# Patient Record
Sex: MALE | Race: WHITE | Employment: OTHER | URBAN - METROPOLITAN AREA
[De-identification: names, ages, dates, MRNs, and addresses within clinical notes are randomized per-mention and may not be internally consistent; named-entity substitution may affect disease eponyms.]

---

## 2017-02-24 ENCOUNTER — OFFICE VISIT (OUTPATIENT)
Dept: CARDIOLOGY CLINIC | Age: 72
End: 2017-02-24

## 2017-02-24 VITALS
WEIGHT: 162 LBS | SYSTOLIC BLOOD PRESSURE: 180 MMHG | HEIGHT: 68 IN | BODY MASS INDEX: 24.55 KG/M2 | DIASTOLIC BLOOD PRESSURE: 80 MMHG | HEART RATE: 55 BPM

## 2017-02-24 DIAGNOSIS — I25.10 CORONARY ARTERY DISEASE INVOLVING NATIVE CORONARY ARTERY OF NATIVE HEART WITHOUT ANGINA PECTORIS: ICD-10-CM

## 2017-02-24 DIAGNOSIS — E78.5 HYPERLIPIDEMIA, UNSPECIFIED HYPERLIPIDEMIA TYPE: ICD-10-CM

## 2017-02-24 DIAGNOSIS — I10 ESSENTIAL HYPERTENSION, BENIGN: Primary | ICD-10-CM

## 2017-02-24 DIAGNOSIS — I65.23 BILATERAL CAROTID ARTERY STENOSIS: ICD-10-CM

## 2017-02-24 PROCEDURE — G8484 FLU IMMUNIZE NO ADMIN: HCPCS | Performed by: INTERNAL MEDICINE

## 2017-02-24 PROCEDURE — G8598 ASA/ANTIPLAT THER USED: HCPCS | Performed by: INTERNAL MEDICINE

## 2017-02-24 PROCEDURE — 1036F TOBACCO NON-USER: CPT | Performed by: INTERNAL MEDICINE

## 2017-02-24 PROCEDURE — G8428 CUR MEDS NOT DOCUMENT: HCPCS | Performed by: INTERNAL MEDICINE

## 2017-02-24 PROCEDURE — 99214 OFFICE O/P EST MOD 30 MIN: CPT | Performed by: INTERNAL MEDICINE

## 2017-02-24 PROCEDURE — 93000 ELECTROCARDIOGRAM COMPLETE: CPT | Performed by: INTERNAL MEDICINE

## 2017-02-24 PROCEDURE — 4040F PNEUMOC VAC/ADMIN/RCVD: CPT | Performed by: INTERNAL MEDICINE

## 2017-02-24 PROCEDURE — 3017F COLORECTAL CA SCREEN DOC REV: CPT | Performed by: INTERNAL MEDICINE

## 2017-02-24 PROCEDURE — G8420 CALC BMI NORM PARAMETERS: HCPCS | Performed by: INTERNAL MEDICINE

## 2017-02-24 PROCEDURE — 1123F ACP DISCUSS/DSCN MKR DOCD: CPT | Performed by: INTERNAL MEDICINE

## 2017-03-03 ENCOUNTER — HOSPITAL ENCOUNTER (OUTPATIENT)
Dept: OTHER | Age: 72
Discharge: HOME | End: 2017-03-03
Attending: INTERNAL MEDICINE | Admitting: INTERNAL MEDICINE

## 2017-04-11 RX ORDER — BENAZEPRIL HYDROCHLORIDE 40 MG/1
TABLET, FILM COATED ORAL
Qty: 30 TABLET | Refills: 6 | Status: SHIPPED | OUTPATIENT
Start: 2017-04-11 | End: 2017-10-28 | Stop reason: SDUPTHER

## 2017-05-02 RX ORDER — ATENOLOL 25 MG/1
TABLET ORAL
Qty: 30 TABLET | Refills: 6 | Status: SHIPPED | OUTPATIENT
Start: 2017-05-02 | End: 2017-11-24 | Stop reason: SDUPTHER

## 2017-06-06 ENCOUNTER — HOSPITAL ENCOUNTER (OUTPATIENT)
Dept: SURGERY | Age: 72
Discharge: HOME | End: 2017-06-06
Attending: UROLOGY | Admitting: UROLOGY

## 2017-06-06 VITALS
BODY MASS INDEX: 24.59 KG/M2 | WEIGHT: 162.26 LBS | DIASTOLIC BLOOD PRESSURE: 58 MMHG | HEIGHT: 68 IN | SYSTOLIC BLOOD PRESSURE: 161 MMHG | OXYGEN SATURATION: 95 % | TEMPERATURE: 97.2 F | HEART RATE: 54 BPM | RESPIRATION RATE: 14 BRPM

## 2017-06-06 LAB
ANION GAP SERPL CALCULATED.3IONS-SCNC: 13 MMOL/L (ref 3–16)
BUN BLDV-MCNC: 17 MG/DL (ref 7–20)
CALCIUM SERPL-MCNC: 9.3 MG/DL (ref 8.3–10.6)
CHLORIDE BLD-SCNC: 99 MMOL/L (ref 99–110)
CO2: 25 MMOL/L (ref 21–32)
CREAT SERPL-MCNC: 1.1 MG/DL (ref 0.8–1.3)
GFR AFRICAN AMERICAN: >60
GFR NON-AFRICAN AMERICAN: >60
GLUCOSE BLD-MCNC: 113 MG/DL (ref 70–99)
HCT VFR BLD CALC: 42.3 % (ref 40.5–52.5)
HEMOGLOBIN: 14.3 G/DL (ref 13.5–17.5)
MCH RBC QN AUTO: 30.4 PG (ref 26–34)
MCHC RBC AUTO-ENTMCNC: 33.7 G/DL (ref 31–36)
MCV RBC AUTO: 90 FL (ref 80–100)
PDW BLD-RTO: 13.7 % (ref 12.4–15.4)
PLATELET # BLD: 244 K/UL (ref 135–450)
PMV BLD AUTO: 7.6 FL (ref 5–10.5)
POTASSIUM SERPL-SCNC: 4.4 MMOL/L (ref 3.5–5.1)
RBC # BLD: 4.7 M/UL (ref 4.2–5.9)
SODIUM BLD-SCNC: 137 MMOL/L (ref 136–145)
WBC # BLD: 8.1 K/UL (ref 4–11)

## 2017-06-06 RX ORDER — CEFAZOLIN SODIUM 2 G/100ML
2 INJECTION, SOLUTION INTRAVENOUS
Status: COMPLETED | OUTPATIENT
Start: 2017-06-06 | End: 2017-06-06

## 2017-06-06 RX ORDER — SODIUM CHLORIDE, SODIUM LACTATE, POTASSIUM CHLORIDE, CALCIUM CHLORIDE 600; 310; 30; 20 MG/100ML; MG/100ML; MG/100ML; MG/100ML
INJECTION, SOLUTION INTRAVENOUS CONTINUOUS
Status: DISCONTINUED | OUTPATIENT
Start: 2017-06-06 | End: 2017-06-07 | Stop reason: HOSPADM

## 2017-06-06 RX ORDER — ONDANSETRON 2 MG/ML
4 INJECTION INTRAMUSCULAR; INTRAVENOUS
Status: ACTIVE | OUTPATIENT
Start: 2017-06-06 | End: 2017-06-06

## 2017-06-06 RX ORDER — SODIUM CHLORIDE 9 MG/ML
INJECTION, SOLUTION INTRAVENOUS CONTINUOUS
Status: DISCONTINUED | OUTPATIENT
Start: 2017-06-06 | End: 2017-06-07 | Stop reason: HOSPADM

## 2017-06-06 RX ORDER — LIDOCAINE HYDROCHLORIDE 10 MG/ML
1 INJECTION, SOLUTION EPIDURAL; INFILTRATION; INTRACAUDAL; PERINEURAL
Status: ACTIVE | OUTPATIENT
Start: 2017-06-06 | End: 2017-06-06

## 2017-06-06 RX ORDER — LIDOCAINE HYDROCHLORIDE 10 MG/ML
0.5 INJECTION, SOLUTION EPIDURAL; INFILTRATION; INTRACAUDAL; PERINEURAL ONCE
Status: DISCONTINUED | OUTPATIENT
Start: 2017-06-06 | End: 2017-06-07 | Stop reason: HOSPADM

## 2017-06-06 RX ORDER — HYDROCODONE BITARTRATE AND ACETAMINOPHEN 5; 325 MG/1; MG/1
1 TABLET ORAL
Status: ACTIVE | OUTPATIENT
Start: 2017-06-06 | End: 2017-06-06

## 2017-06-06 RX ORDER — CEFAZOLIN SODIUM 2 G/100ML
INJECTION, SOLUTION INTRAVENOUS
Status: DISPENSED
Start: 2017-06-06 | End: 2017-06-06

## 2017-06-06 RX ORDER — SODIUM CHLORIDE, SODIUM LACTATE, POTASSIUM CHLORIDE, CALCIUM CHLORIDE 600; 310; 30; 20 MG/100ML; MG/100ML; MG/100ML; MG/100ML
INJECTION, SOLUTION INTRAVENOUS
Status: DISPENSED
Start: 2017-06-06 | End: 2017-06-06

## 2017-06-06 RX ORDER — HYDROMORPHONE HCL 110MG/55ML
0.25 PATIENT CONTROLLED ANALGESIA SYRINGE INTRAVENOUS EVERY 5 MIN PRN
Status: DISCONTINUED | OUTPATIENT
Start: 2017-06-06 | End: 2017-06-07 | Stop reason: HOSPADM

## 2017-06-06 RX ADMIN — CEFAZOLIN SODIUM 2 G: 2 INJECTION, SOLUTION INTRAVENOUS at 09:49

## 2017-06-06 ASSESSMENT — COPD QUESTIONNAIRES: CAT_SEVERITY: MILD

## 2017-06-06 ASSESSMENT — ENCOUNTER SYMPTOMS: SHORTNESS OF BREATH: 0

## 2017-07-07 ENCOUNTER — TELEPHONE (OUTPATIENT)
Dept: ORTHOPEDIC SURGERY | Age: 72
End: 2017-07-07

## 2017-08-25 ENCOUNTER — OFFICE VISIT (OUTPATIENT)
Dept: CARDIOLOGY CLINIC | Age: 72
End: 2017-08-25

## 2017-08-25 VITALS
HEART RATE: 56 BPM | SYSTOLIC BLOOD PRESSURE: 180 MMHG | WEIGHT: 165 LBS | DIASTOLIC BLOOD PRESSURE: 66 MMHG | BODY MASS INDEX: 25.9 KG/M2 | HEIGHT: 67 IN

## 2017-08-25 DIAGNOSIS — I25.10 CORONARY ARTERY DISEASE INVOLVING NATIVE CORONARY ARTERY OF NATIVE HEART WITHOUT ANGINA PECTORIS: ICD-10-CM

## 2017-08-25 DIAGNOSIS — I65.23 BILATERAL CAROTID ARTERY STENOSIS: ICD-10-CM

## 2017-08-25 DIAGNOSIS — I10 ESSENTIAL HYPERTENSION, BENIGN: Primary | ICD-10-CM

## 2017-08-25 DIAGNOSIS — E78.5 HYPERLIPIDEMIA, UNSPECIFIED HYPERLIPIDEMIA TYPE: ICD-10-CM

## 2017-08-25 PROCEDURE — G8427 DOCREV CUR MEDS BY ELIG CLIN: HCPCS | Performed by: INTERNAL MEDICINE

## 2017-08-25 PROCEDURE — 4040F PNEUMOC VAC/ADMIN/RCVD: CPT | Performed by: INTERNAL MEDICINE

## 2017-08-25 PROCEDURE — 3017F COLORECTAL CA SCREEN DOC REV: CPT | Performed by: INTERNAL MEDICINE

## 2017-08-25 PROCEDURE — 99214 OFFICE O/P EST MOD 30 MIN: CPT | Performed by: INTERNAL MEDICINE

## 2017-08-25 PROCEDURE — G8419 CALC BMI OUT NRM PARAM NOF/U: HCPCS | Performed by: INTERNAL MEDICINE

## 2017-08-25 PROCEDURE — G8598 ASA/ANTIPLAT THER USED: HCPCS | Performed by: INTERNAL MEDICINE

## 2017-08-25 PROCEDURE — 1123F ACP DISCUSS/DSCN MKR DOCD: CPT | Performed by: INTERNAL MEDICINE

## 2017-08-25 PROCEDURE — 1036F TOBACCO NON-USER: CPT | Performed by: INTERNAL MEDICINE

## 2017-08-25 RX ORDER — FUROSEMIDE 20 MG/1
20 TABLET ORAL DAILY
Qty: 30 TABLET | Refills: 6 | Status: SHIPPED | OUTPATIENT
Start: 2017-08-25 | End: 2018-02-09 | Stop reason: SDUPTHER

## 2017-08-29 RX ORDER — ATORVASTATIN CALCIUM 80 MG/1
TABLET, FILM COATED ORAL
Qty: 30 TABLET | Refills: 6 | Status: SHIPPED | OUTPATIENT
Start: 2017-08-29 | End: 2018-02-09 | Stop reason: SDUPTHER

## 2017-08-31 ENCOUNTER — OFFICE VISIT (OUTPATIENT)
Dept: ORTHOPEDIC SURGERY | Age: 72
End: 2017-08-31

## 2017-08-31 VITALS
DIASTOLIC BLOOD PRESSURE: 66 MMHG | WEIGHT: 164 LBS | HEART RATE: 62 BPM | SYSTOLIC BLOOD PRESSURE: 157 MMHG | HEIGHT: 68 IN | BODY MASS INDEX: 24.86 KG/M2

## 2017-08-31 DIAGNOSIS — Z96.612 STATUS POST REVERSE TOTAL ARTHROPLASTY OF LEFT SHOULDER: Primary | ICD-10-CM

## 2017-08-31 PROCEDURE — 1036F TOBACCO NON-USER: CPT | Performed by: ORTHOPAEDIC SURGERY

## 2017-08-31 PROCEDURE — 99213 OFFICE O/P EST LOW 20 MIN: CPT | Performed by: ORTHOPAEDIC SURGERY

## 2017-08-31 PROCEDURE — 3017F COLORECTAL CA SCREEN DOC REV: CPT | Performed by: ORTHOPAEDIC SURGERY

## 2017-08-31 PROCEDURE — G8420 CALC BMI NORM PARAMETERS: HCPCS | Performed by: ORTHOPAEDIC SURGERY

## 2017-08-31 PROCEDURE — 4040F PNEUMOC VAC/ADMIN/RCVD: CPT | Performed by: ORTHOPAEDIC SURGERY

## 2017-08-31 PROCEDURE — G8427 DOCREV CUR MEDS BY ELIG CLIN: HCPCS | Performed by: ORTHOPAEDIC SURGERY

## 2017-08-31 PROCEDURE — G8598 ASA/ANTIPLAT THER USED: HCPCS | Performed by: ORTHOPAEDIC SURGERY

## 2017-08-31 PROCEDURE — 1123F ACP DISCUSS/DSCN MKR DOCD: CPT | Performed by: ORTHOPAEDIC SURGERY

## 2017-10-30 RX ORDER — BENAZEPRIL HYDROCHLORIDE 40 MG/1
TABLET, FILM COATED ORAL
Qty: 90 TABLET | Refills: 3 | Status: SHIPPED | OUTPATIENT
Start: 2017-10-30 | End: 2018-02-09 | Stop reason: SDUPTHER

## 2017-11-24 RX ORDER — ATENOLOL 25 MG/1
TABLET ORAL
Qty: 30 TABLET | Refills: 6 | Status: SHIPPED | OUTPATIENT
Start: 2017-11-24 | End: 2018-02-09 | Stop reason: SDUPTHER

## 2018-02-09 ENCOUNTER — OFFICE VISIT (OUTPATIENT)
Dept: CARDIOLOGY CLINIC | Age: 73
End: 2018-02-09

## 2018-02-09 VITALS
HEIGHT: 68 IN | WEIGHT: 161 LBS | BODY MASS INDEX: 24.4 KG/M2 | HEART RATE: 60 BPM | DIASTOLIC BLOOD PRESSURE: 68 MMHG | SYSTOLIC BLOOD PRESSURE: 150 MMHG

## 2018-02-09 DIAGNOSIS — I65.23 BILATERAL CAROTID ARTERY STENOSIS: ICD-10-CM

## 2018-02-09 DIAGNOSIS — I25.10 CORONARY ARTERY DISEASE INVOLVING NATIVE CORONARY ARTERY OF NATIVE HEART WITHOUT ANGINA PECTORIS: Primary | ICD-10-CM

## 2018-02-09 DIAGNOSIS — E78.49 OTHER HYPERLIPIDEMIA: ICD-10-CM

## 2018-02-09 DIAGNOSIS — R00.1 BRADYCARDIA: ICD-10-CM

## 2018-02-09 DIAGNOSIS — I10 ESSENTIAL HYPERTENSION, BENIGN: ICD-10-CM

## 2018-02-09 PROCEDURE — G8484 FLU IMMUNIZE NO ADMIN: HCPCS | Performed by: INTERNAL MEDICINE

## 2018-02-09 PROCEDURE — 99214 OFFICE O/P EST MOD 30 MIN: CPT | Performed by: INTERNAL MEDICINE

## 2018-02-09 PROCEDURE — 4040F PNEUMOC VAC/ADMIN/RCVD: CPT | Performed by: INTERNAL MEDICINE

## 2018-02-09 PROCEDURE — G8420 CALC BMI NORM PARAMETERS: HCPCS | Performed by: INTERNAL MEDICINE

## 2018-02-09 PROCEDURE — 1123F ACP DISCUSS/DSCN MKR DOCD: CPT | Performed by: INTERNAL MEDICINE

## 2018-02-09 PROCEDURE — 1036F TOBACCO NON-USER: CPT | Performed by: INTERNAL MEDICINE

## 2018-02-09 PROCEDURE — G8598 ASA/ANTIPLAT THER USED: HCPCS | Performed by: INTERNAL MEDICINE

## 2018-02-09 PROCEDURE — G8427 DOCREV CUR MEDS BY ELIG CLIN: HCPCS | Performed by: INTERNAL MEDICINE

## 2018-02-09 PROCEDURE — 3017F COLORECTAL CA SCREEN DOC REV: CPT | Performed by: INTERNAL MEDICINE

## 2018-02-09 RX ORDER — ATORVASTATIN CALCIUM 80 MG/1
TABLET, FILM COATED ORAL
Qty: 90 TABLET | Refills: 3 | Status: SHIPPED | OUTPATIENT
Start: 2018-02-09 | End: 2019-03-20 | Stop reason: SDUPTHER

## 2018-02-09 RX ORDER — BENAZEPRIL HYDROCHLORIDE 40 MG/1
TABLET, FILM COATED ORAL
Qty: 90 TABLET | Refills: 3 | Status: SHIPPED | OUTPATIENT
Start: 2018-02-09 | End: 2019-05-09 | Stop reason: SDUPTHER

## 2018-02-09 RX ORDER — DOXAZOSIN MESYLATE 4 MG/1
4 TABLET ORAL NIGHTLY
Qty: 90 TABLET | Refills: 3 | Status: SHIPPED | OUTPATIENT
Start: 2018-02-09 | End: 2019-02-06 | Stop reason: SDUPTHER

## 2018-02-09 RX ORDER — FUROSEMIDE 20 MG/1
40 TABLET ORAL DAILY
Qty: 180 TABLET | Refills: 3 | Status: SHIPPED | OUTPATIENT
Start: 2018-02-09 | End: 2018-08-31 | Stop reason: CLARIF

## 2018-02-09 RX ORDER — ATENOLOL 25 MG/1
TABLET ORAL
Qty: 90 TABLET | Refills: 3 | Status: SHIPPED | OUTPATIENT
Start: 2018-02-09 | End: 2019-03-06 | Stop reason: SDUPTHER

## 2018-02-09 RX ORDER — DULOXETIN HYDROCHLORIDE 60 MG/1
60 CAPSULE, DELAYED RELEASE ORAL DAILY
COMMUNITY
End: 2022-05-31

## 2018-02-09 NOTE — PROGRESS NOTES
no sputum production. No hematemesis. · Gastrointestinal: No abdominal pain, appetite loss, blood in stools. No change in bowel or bladder habits. · Genitourinary: No nocturia, dysuria, trouble voiding. · Musculoskeletal:  No gait disturbance, weakness or joint complaints. Large reddened abrasion noted LLE shin. · Integumentary: No rash or pruritis. · Neurological: No headache, change in muscle strength, numbness or tingling. No change in gait, balance, coordination, mood, affect, memory, mentation, behavior. · Psychiatric: No anxiety or depression  · Endocrine: No malaise or fever  · Hematologic/Lymphatic: No abnormal bruising or bleeding, blood clots or swollen lymph nodes. · Allergic/Immunologic: No nasal congestion or hives. Physical Examination:    Vitals:    02/09/18 1411 02/09/18 1418   BP: (!) 158/68 (!) 150/68   Site: Right Arm    Position: Sitting    Cuff Size: Medium Adult    Pulse: 60    Weight: 161 lb (73 kg)    Height: 5' 8\" (1.727 m)      Body mass index is 24.48 kg/m². Wt Readings from Last 3 Encounters:   02/09/18 161 lb (73 kg)   08/31/17 164 lb (74.4 kg)   08/25/17 165 lb (74.8 kg)     BP Readings from Last 3 Encounters:   02/09/18 (!) 150/68   08/31/17 (!) 157/66   08/25/17 (!) 180/66       Constitutional and General Appearance:  appears stated age  Respiratory:  · Normal excursion and expansion without use of accessory muscles  · Resp Auscultation: Normal breath sounds without dullness  Cardiovascular:  · The apical impulses not displaced  · Heart is regular rate and rhythm with normal S1, S2  · The carotid upstroke is normal,  right bruit auscultated.    · JVP is not elevated  · Peripheral pulses are symmetrical  · There is no clubbing, cyanosis of the extremities  · 2+ edema BLE  · Femoral Arteries: 2+ and equal  · Pedal Pulses: 2+ and equal   Abdomen:  · No masses or tenderness  · Normal bowel sounds  Neurological/Psychiatric:  · Alert and oriented x3  · Moves all extremities well. Quarter sized reddened abrasion noted lower right shin, etiology unclear, 1days old. · Exhibits normal gait balance and coordination    Assessment/Plan:  1. CAD (coronary artery disease): Stable, no anginal symptoms. GXT tamia 5> normal myocardial perfusion  GXT Tamia 3/5/08> small sized basilar fixed defect consistent with ischemia  Angiogram 8/14/07> Taxus to distal RCA and mid LAD. Not on Plavix due to hematuria from the bladder cancer. Takes 81mg aspirin   2. Other and unspecified hyperlipidemia:  11/22/17> , , HDL 39, LDL 99 -- stable Lipitor 80mg. 3. 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 Doppler 3/17> 50-79% bilateral  Dopplers 6/53> MARILIN 94-85%, LICA 16-95%   4. Essential hypertension, benign: SBP elevated. Blood pressure (!) 150/68, pulse 60, height 5' 8\" (1.727 m), weight 161 lb (73 kg). Increase Lasix from 20mg to 40mg daily. Add Cardura 4mg nightly. 5.  Sinus bradycardia, h/o: 60 today. 6.  Bladder cancer, Stage I, h/o: Stable, no more hematuria, has flow issues. Has not required chemo as of yet. 7.  Myelomalacia and a spinal cord lesion: Following closely with Dr Daria Buerger    1. Double Lasix to 40mg qd. Begin Cardura 4mg hs.  2. Check B/P every day, record, bring into the office. 3. Return for follow up in 6 months.

## 2018-03-07 RX ORDER — FUROSEMIDE 20 MG/1
20 TABLET ORAL DAILY
Qty: 30 TABLET | Refills: 6 | Status: SHIPPED | OUTPATIENT
Start: 2018-03-07 | End: 2018-08-31 | Stop reason: SDUPTHER

## 2018-03-09 ENCOUNTER — HOSPITAL ENCOUNTER (OUTPATIENT)
Dept: CARDIOLOGY | Age: 73
Discharge: HOME | End: 2018-03-09
Attending: INTERNAL MEDICINE | Admitting: INTERNAL MEDICINE

## 2018-03-09 DIAGNOSIS — I65.23 OCCLUSION AND STENOSIS OF BILATERAL CAROTID ARTERIES: ICD-10-CM

## 2018-03-21 ENCOUNTER — TELEPHONE (OUTPATIENT)
Dept: CARDIOLOGY CLINIC | Age: 73
End: 2018-03-21

## 2018-08-31 ENCOUNTER — OFFICE VISIT (OUTPATIENT)
Dept: CARDIOLOGY CLINIC | Age: 73
End: 2018-08-31

## 2018-08-31 VITALS
SYSTOLIC BLOOD PRESSURE: 160 MMHG | HEART RATE: 64 BPM | BODY MASS INDEX: 23.49 KG/M2 | WEIGHT: 155 LBS | HEIGHT: 68 IN | DIASTOLIC BLOOD PRESSURE: 60 MMHG

## 2018-08-31 DIAGNOSIS — I65.23 BILATERAL CAROTID ARTERY STENOSIS: ICD-10-CM

## 2018-08-31 DIAGNOSIS — I10 ESSENTIAL HYPERTENSION, BENIGN: ICD-10-CM

## 2018-08-31 DIAGNOSIS — C67.9 MALIGNANT NEOPLASM OF URINARY BLADDER, UNSPECIFIED SITE (HCC): ICD-10-CM

## 2018-08-31 DIAGNOSIS — I25.10 CORONARY ARTERY DISEASE INVOLVING NATIVE CORONARY ARTERY OF NATIVE HEART WITHOUT ANGINA PECTORIS: Primary | ICD-10-CM

## 2018-08-31 DIAGNOSIS — E78.49 OTHER HYPERLIPIDEMIA: ICD-10-CM

## 2018-08-31 PROCEDURE — G8598 ASA/ANTIPLAT THER USED: HCPCS | Performed by: INTERNAL MEDICINE

## 2018-08-31 PROCEDURE — 3017F COLORECTAL CA SCREEN DOC REV: CPT | Performed by: INTERNAL MEDICINE

## 2018-08-31 PROCEDURE — G8427 DOCREV CUR MEDS BY ELIG CLIN: HCPCS | Performed by: INTERNAL MEDICINE

## 2018-08-31 PROCEDURE — 4040F PNEUMOC VAC/ADMIN/RCVD: CPT | Performed by: INTERNAL MEDICINE

## 2018-08-31 PROCEDURE — G8420 CALC BMI NORM PARAMETERS: HCPCS | Performed by: INTERNAL MEDICINE

## 2018-08-31 PROCEDURE — 1036F TOBACCO NON-USER: CPT | Performed by: INTERNAL MEDICINE

## 2018-08-31 PROCEDURE — 1123F ACP DISCUSS/DSCN MKR DOCD: CPT | Performed by: INTERNAL MEDICINE

## 2018-08-31 PROCEDURE — 99214 OFFICE O/P EST MOD 30 MIN: CPT | Performed by: INTERNAL MEDICINE

## 2018-08-31 PROCEDURE — 1101F PT FALLS ASSESS-DOCD LE1/YR: CPT | Performed by: INTERNAL MEDICINE

## 2018-08-31 RX ORDER — FUROSEMIDE 40 MG/1
40 TABLET ORAL DAILY
Qty: 90 TABLET | Refills: 3 | Status: SHIPPED | OUTPATIENT
Start: 2018-08-31 | End: 2019-09-20

## 2018-08-31 NOTE — LETTER
[address] [address] [address] Charlie Mccormack [address] [address]  Phone: [phone]  Fax: [phone]    Robert Diaz MD        September 4, 2018     [address] [place] [address]  [id number] [facility] [address] [address]    Patient: Rudolph Lord  MR Number: [id number]  YOB: 1945  Date of Visit: 8/31/2018    Dear  [address] [place] [address]:      [address]   Cardiac Evaluation      Patient: Rudolph Lord  YOB: 1945  Date: 8/31/18     Chief Complaint   Patient presents with    Coronary Artery Disease    Hypertension      Referring provider: [address] [place] [address]    History of Present Illness:   Mr. Ishaan Abarca is seen today for follow up. Cardiac history includes coronary disease, hypertension, and hyperlipidemia. He had angioplasty and stent placement of RCA and LAD in 2007 after a positive stress test and chest pain. Other history includes bladder cancer which is apparently stable. Quit smoking in 2008. At our last visit I increased his Lasix to 40 mg daily and added Cardura 4 mg nightly. Today, Mr. Ihsaan Abarca states he has been alright. Patient denies shortness of breath, palpitations, or dizziness. States his chest feels \"tight\" in his chest but it \"isn't painful\" and has been like that for years. Denies hematuria, but his bladder isn't emptying fully. Says he hasn't been very active. Past Medical History:  has a past medical history of CAD (coronary artery disease); Arthritis; Hypertension; GERD,Hyperlipidemia. Bladder cancer    Surgical History:   has a past surgical history that includes Coronary angioplasty with stent; hernia repair; Cystoscopy (6-7-10); Colonoscopy; Cystocopy (1/9/12); Cystoscopy (7/23/12); Cystocopy (2/25/13); Cystoscopy (8/26/13); Cystoscopy (4/29/14); Cystocopy (12/15/14); Cystocopy (8/3/2015); other surgical history (Left, 8/28/15); joint replacement; Cystocopy (5/24/16); and Cystocopy (06/04/2017). · Resp Auscultation: Normal breath sounds without dullness  Cardiovascular:  · The apical impulses not displaced  · Heart is regular rate and rhythm with normal S1, S2  · The carotid upstroke is normal,  right bruit auscultated. · JVP is not elevated  · Peripheral pulses are symmetrical  · Trace edema in bilateral ankles  · There is no clubbing, cyanosis of the extremities  · Femoral Arteries: 2+ and equal  · Pedal Pulses: 2+ and equal   Abdomen:  · No masses or tenderness  · Normal bowel sounds  Neurological/Psychiatric:  · Alert and oriented x3  · Moves all extremities well. · Exhibits normal gait balance and coordination    Assessment:  1. CAD (coronary artery disease): Stable, no anginal symptoms. GXT leo 6/25/2015> normal myocardial perfusion  GXT Leo 3/5/08> small sized basilar fixed defect consistent with ischemia  Angiogram 8/14/07> Taxus to distal RCA and mid LAD. Not on Plavix due to hematuria from the bladder cancer. Takes 81mg aspirin   2. Other and unspecified hyperlipidemia:  6/13/18> , TG 93, HDL 42, LDL 93 -- stable Lipitor 80mg. 3. 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 Doppler 3/18> MARILIN <02% stenosis, LICA 06-11% stenosis  Doppler 3/17> 50-79% bilateral  Dopplers 9/02> MARILIN 28-84%, LICA 95-49%   4. Essential hypertension, benign: Blood pressure (!) 160/60, pulse 64, height 5' 8\" (1.727 m), weight 155 lb (70.3 kg). States yesterday was in the 140's, did not take Lasix this am.  Reminded of med compliance. 5.  Sinus bradycardia, h/o: 64 today. Stable  6. Bladder cancer, Stage I, h/o: Stable, no more hematuria, has flow issues. Has not required chemo as of yet. 7.  Myelomalacia and a spinal cord lesion: Following closely with Dr Juan Grewal. His PCP has ordered LE dopplers for his legs. His pulses are normal.     Plan:  1. Change Lasix to 40 mg daily  2. Continue other meds  3. Follow up in 6 months    Scribe's Attestation:  This note was scribed in the presence of Dr. Jany Blake,

## 2018-08-31 NOTE — PROGRESS NOTES
[facility]   Cardiac Evaluation      Patient: Adelfo Burch  YOB: 1945  Date: 8/31/18     Chief Complaint   Patient presents with    Coronary Artery Disease    Hypertension      Referring provider: Marcela Cordero    History of Present Illness:   Mr. Kelsea Koenig is seen today for follow up. Cardiac history includes coronary disease, hypertension, and hyperlipidemia. He had angioplasty and stent placement of RCA and LAD in 2007 after a positive stress test and chest pain. Other history includes bladder cancer which is apparently stable. Quit smoking in 2008. At our last visit I increased his Lasix to 40 mg daily and added Cardura 4 mg nightly. Today, Mr. Kelsea Koenig states he has been alright. Patient denies shortness of breath, palpitations, or dizziness. States his chest feels \"tight\" in his chest but it \"isn't painful\" and has been like that for years. Denies hematuria, but his bladder isn't emptying fully. Says he hasn't been very active. Past Medical History:  has a past medical history of CAD (coronary artery disease); Arthritis; Hypertension; GERD,Hyperlipidemia. Bladder cancer    Surgical History:   has a past surgical history that includes Coronary angioplasty with stent; hernia repair; Cystoscopy (6-7-10); Colonoscopy; Cystocopy (1/9/12); Cystoscopy (7/23/12); Cystocopy (2/25/13); Cystoscopy (8/26/13); Cystoscopy (4/29/14); Cystocopy (12/15/14); Cystocopy (8/3/2015); other surgical history (Left, 8/28/15); joint replacement; Cystocopy (5/24/16); and Cystocopy (06/04/2017). Social History:   reports that he quit smoking in 2008. He reports that he does not currently drink alcohol or use illicit drugs. Family History:  family history includes Cancer in his father; Stroke in his mother. Allergies:  Patient has no known allergies. Review of Systems:   · Constitutional: there has been no unanticipated weight loss.   · Eyes: No visual changes   · ENT: No Headaches, masses or tenderness  · Normal bowel sounds  Neurological/Psychiatric:  · Alert and oriented x3  · Moves all extremities well. · Exhibits normal gait balance and coordination    Assessment:  1. CAD (coronary artery disease): Stable, no anginal symptoms. GXT leo 6/25/2015> normal myocardial perfusion  GXT Leo 3/5/08> small sized basilar fixed defect consistent with ischemia  Angiogram 8/14/07> Taxus to distal RCA and mid LAD. Not on Plavix due to hematuria from the bladder cancer. Takes 81mg aspirin   2. Other and unspecified hyperlipidemia:  6/13/18> , TG 93, HDL 42, LDL 93 -- stable Lipitor 80mg. 3. 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 Doppler 3/18> MARILIN <75% stenosis, LICA 43-11% stenosis  Doppler 3/17> 50-79% bilateral  Dopplers 7/75> MARILIN 79-66%, LICA 13-74%   4. Essential hypertension, benign: Blood pressure (!) 160/60, pulse 64, height 5' 8\" (1.727 m), weight 155 lb (70.3 kg). States yesterday was in the 140's, did not take Lasix this am.  Reminded of med compliance. 5.  Sinus bradycardia, h/o: 64 today. Stable  6. Bladder cancer, Stage I, h/o: Stable, no more hematuria, has flow issues. Has not required chemo as of yet. 7.  Myelomalacia and a spinal cord lesion: Following closely with Dr Mari Hernandez. His PCP has ordered LE dopplers for his legs. His pulses are normal.     Plan:  1. Change Lasix to 40 mg daily  2. Continue other meds  3. Follow up in 6 months    Scribe's Attestation: This note was scribed in the presence of Dr. Lurdes Allison MD by Darlene Rooney RN. The scribe's documentation has been prepared under my direction and personally reviewed by me in its entirety. I confirm that the note above accurately reflects all work, treatment, procedures, and medical decision making performed by me.        Willem Merrill MD

## 2018-09-04 NOTE — COMMUNICATION BODY
hearing loss or vertigo. No mouth sores or sore throat. · Cardiovascular: Reviewed in HPI  · Respiratory: No cough or wheezing, no sputum production. No hematemesis. · Gastrointestinal: No abdominal pain, appetite loss, blood in stools. No change in bowel or bladder habits. · Genitourinary: No nocturia, dysuria, trouble voiding. · Musculoskeletal:  No gait disturbance, weakness or joint complaints. Large reddened abrasion noted LLE shin. · Integumentary: No rash or pruritis. · Neurological: No headache, change in muscle strength, numbness or tingling. No change in gait, balance, coordination, mood, affect, memory, mentation, behavior. · Psychiatric: No anxiety or depression  · Endocrine: No malaise or fever  · Hematologic/Lymphatic: No abnormal bruising or bleeding, blood clots or swollen lymph nodes. · Allergic/Immunologic: No nasal congestion or hives. Physical Examination:    Vitals:    08/31/18 1349   BP: (!) 160/60   Site: Left Arm   Position: Sitting   Cuff Size: Medium Adult   Pulse: 64   Weight: 155 lb (70.3 kg)   Height: 5' 8\" (1.727 m)     Body mass index is 23.57 kg/m². Wt Readings from Last 3 Encounters:   08/31/18 155 lb (70.3 kg)   02/09/18 161 lb (73 kg)   08/31/17 164 lb (74.4 kg)     BP Readings from Last 3 Encounters:   08/31/18 (!) 160/60   02/09/18 (!) 150/68   08/31/17 (!) 157/66       Constitutional and General Appearance:  appears stated age  Respiratory:  · Normal excursion and expansion without use of accessory muscles  · Resp Auscultation: Normal breath sounds without dullness  Cardiovascular:  · The apical impulses not displaced  · Heart is regular rate and rhythm with normal S1, S2  · The carotid upstroke is normal,  right bruit auscultated.    · JVP is not elevated  · Peripheral pulses are symmetrical  · Trace edema in bilateral ankles  · There is no clubbing, cyanosis of the extremities  · Femoral Arteries: 2+ and equal  · Pedal Pulses: 2+ and equal   Abdomen:  · No masses or tenderness  · Normal bowel sounds  Neurological/Psychiatric:  · Alert and oriented x3  · Moves all extremities well. · Exhibits normal gait balance and coordination    Assessment:  1. CAD (coronary artery disease): Stable, no anginal symptoms. GXT leo 6/25/2015> normal myocardial perfusion  GXT Leo 3/5/08> small sized basilar fixed defect consistent with ischemia  Angiogram 8/14/07> Taxus to distal RCA and mid LAD. Not on Plavix due to hematuria from the bladder cancer. Takes 81mg aspirin   2. Other and unspecified hyperlipidemia:  6/13/18> , TG 93, HDL 42, LDL 93 -- stable Lipitor 80mg. 3. 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 Doppler 3/18> MARILIN <41% stenosis, LICA 05-28% stenosis  Doppler 3/17> 50-79% bilateral  Dopplers 2/99> MARILIN 72-72%, LICA 21-94%   4. Essential hypertension, benign: Blood pressure (!) 160/60, pulse 64, height 5' 8\" (1.727 m), weight 155 lb (70.3 kg). States yesterday was in the 140's, did not take Lasix this am.  Reminded of med compliance. 5.  Sinus bradycardia, h/o: 64 today. Stable  6. Bladder cancer, Stage I, h/o: Stable, no more hematuria, has flow issues. Has not required chemo as of yet. 7.  Myelomalacia and a spinal cord lesion: Following closely with Dr Mari Hernandez. His PCP has ordered LE dopplers for his legs. His pulses are normal.     Plan:  1. Change Lasix to 40 mg daily  2. Continue other meds  3. Follow up in 6 months    Scribe's Attestation: This note was scribed in the presence of Dr. Lurdes Allison MD by Darlene Rooney RN. The scribe's documentation has been prepared under my direction and personally reviewed by me in its entirety. I confirm that the note above accurately reflects all work, treatment, procedures, and medical decision making performed by me.        Willem Merrill MD

## 2018-10-16 ENCOUNTER — HOSPITAL ENCOUNTER (OUTPATIENT)
Dept: NON INVASIVE DIAGNOSTICS | Age: 73
Discharge: HOME | End: 2018-10-16
Payer: MEDICARE

## 2018-10-16 LAB
LEFT VENTRICULAR EJECTION FRACTION HIGH VALUE: 60 %
LEFT VENTRICULAR EJECTION FRACTION MODE: NORMAL
LV EF: 55 %
LV EF: 58 %
LVEF MODALITY: NORMAL

## 2018-10-16 PROCEDURE — 93306 TTE W/DOPPLER COMPLETE: CPT

## 2019-02-06 RX ORDER — DOXAZOSIN MESYLATE 4 MG/1
4 TABLET ORAL NIGHTLY
Qty: 90 TABLET | Refills: 3 | Status: SHIPPED | OUTPATIENT
Start: 2019-02-06 | End: 2020-01-24

## 2019-02-22 ENCOUNTER — OFFICE VISIT (OUTPATIENT)
Dept: CARDIOLOGY CLINIC | Age: 74
End: 2019-02-22
Payer: MEDICARE

## 2019-02-22 VITALS
HEIGHT: 68 IN | SYSTOLIC BLOOD PRESSURE: 132 MMHG | HEART RATE: 59 BPM | BODY MASS INDEX: 24.55 KG/M2 | WEIGHT: 162 LBS | DIASTOLIC BLOOD PRESSURE: 62 MMHG

## 2019-02-22 DIAGNOSIS — E78.49 OTHER HYPERLIPIDEMIA: ICD-10-CM

## 2019-02-22 DIAGNOSIS — I10 ESSENTIAL HYPERTENSION, BENIGN: Primary | ICD-10-CM

## 2019-02-22 DIAGNOSIS — I25.10 CORONARY ARTERY DISEASE INVOLVING NATIVE CORONARY ARTERY OF NATIVE HEART WITHOUT ANGINA PECTORIS: ICD-10-CM

## 2019-02-22 DIAGNOSIS — R20.2 NUMBNESS AND TINGLING: ICD-10-CM

## 2019-02-22 DIAGNOSIS — R20.0 NUMBNESS AND TINGLING: ICD-10-CM

## 2019-02-22 PROCEDURE — G8484 FLU IMMUNIZE NO ADMIN: HCPCS | Performed by: INTERNAL MEDICINE

## 2019-02-22 PROCEDURE — 1036F TOBACCO NON-USER: CPT | Performed by: INTERNAL MEDICINE

## 2019-02-22 PROCEDURE — 1101F PT FALLS ASSESS-DOCD LE1/YR: CPT | Performed by: INTERNAL MEDICINE

## 2019-02-22 PROCEDURE — 93000 ELECTROCARDIOGRAM COMPLETE: CPT | Performed by: INTERNAL MEDICINE

## 2019-02-22 PROCEDURE — 99214 OFFICE O/P EST MOD 30 MIN: CPT | Performed by: INTERNAL MEDICINE

## 2019-02-22 PROCEDURE — G8598 ASA/ANTIPLAT THER USED: HCPCS | Performed by: INTERNAL MEDICINE

## 2019-02-22 PROCEDURE — 4040F PNEUMOC VAC/ADMIN/RCVD: CPT | Performed by: INTERNAL MEDICINE

## 2019-02-22 PROCEDURE — 3017F COLORECTAL CA SCREEN DOC REV: CPT | Performed by: INTERNAL MEDICINE

## 2019-02-22 PROCEDURE — G8420 CALC BMI NORM PARAMETERS: HCPCS | Performed by: INTERNAL MEDICINE

## 2019-02-22 PROCEDURE — 1123F ACP DISCUSS/DSCN MKR DOCD: CPT | Performed by: INTERNAL MEDICINE

## 2019-02-22 PROCEDURE — G8428 CUR MEDS NOT DOCUMENT: HCPCS | Performed by: INTERNAL MEDICINE

## 2019-02-22 RX ORDER — SPIRONOLACTONE 25 MG/1
25 TABLET ORAL DAILY
COMMUNITY
End: 2022-05-31

## 2019-03-06 RX ORDER — ATENOLOL 25 MG/1
TABLET ORAL
Qty: 90 TABLET | Refills: 3 | Status: SHIPPED | OUTPATIENT
Start: 2019-03-06 | End: 2020-02-03

## 2019-03-07 ENCOUNTER — TELEPHONE (OUTPATIENT)
Dept: CARDIOLOGY CLINIC | Age: 74
End: 2019-03-07

## 2019-03-07 DIAGNOSIS — E11.9 TYPE 2 DIABETES MELLITUS WITHOUT COMPLICATION, WITHOUT LONG-TERM CURRENT USE OF INSULIN (HCC): Primary | ICD-10-CM

## 2019-03-20 RX ORDER — ATORVASTATIN CALCIUM 80 MG/1
TABLET, FILM COATED ORAL
Qty: 90 TABLET | Refills: 3 | Status: SHIPPED | OUTPATIENT
Start: 2019-03-20 | End: 2020-03-09

## 2019-05-09 RX ORDER — BENAZEPRIL HYDROCHLORIDE 40 MG/1
TABLET, FILM COATED ORAL
Qty: 90 TABLET | Refills: 3 | Status: SHIPPED | OUTPATIENT
Start: 2019-05-09 | End: 2020-03-23 | Stop reason: SDUPTHER

## 2019-09-20 ENCOUNTER — OFFICE VISIT (OUTPATIENT)
Dept: CARDIOLOGY CLINIC | Age: 74
End: 2019-09-20
Payer: MEDICARE

## 2019-09-20 VITALS
DIASTOLIC BLOOD PRESSURE: 70 MMHG | SYSTOLIC BLOOD PRESSURE: 174 MMHG | BODY MASS INDEX: 24.71 KG/M2 | HEIGHT: 68 IN | WEIGHT: 163 LBS | HEART RATE: 61 BPM | OXYGEN SATURATION: 95 %

## 2019-09-20 DIAGNOSIS — E78.49 OTHER HYPERLIPIDEMIA: Primary | ICD-10-CM

## 2019-09-20 DIAGNOSIS — I25.10 CORONARY ARTERY DISEASE INVOLVING NATIVE CORONARY ARTERY OF NATIVE HEART WITHOUT ANGINA PECTORIS: ICD-10-CM

## 2019-09-20 DIAGNOSIS — I10 ESSENTIAL HYPERTENSION, BENIGN: ICD-10-CM

## 2019-09-20 DIAGNOSIS — R00.1 BRADYCARDIA: ICD-10-CM

## 2019-09-20 PROCEDURE — 99214 OFFICE O/P EST MOD 30 MIN: CPT | Performed by: INTERNAL MEDICINE

## 2019-09-20 PROCEDURE — 4040F PNEUMOC VAC/ADMIN/RCVD: CPT | Performed by: INTERNAL MEDICINE

## 2019-09-20 PROCEDURE — G8598 ASA/ANTIPLAT THER USED: HCPCS | Performed by: INTERNAL MEDICINE

## 2019-09-20 PROCEDURE — G8420 CALC BMI NORM PARAMETERS: HCPCS | Performed by: INTERNAL MEDICINE

## 2019-09-20 PROCEDURE — 1036F TOBACCO NON-USER: CPT | Performed by: INTERNAL MEDICINE

## 2019-09-20 PROCEDURE — 1123F ACP DISCUSS/DSCN MKR DOCD: CPT | Performed by: INTERNAL MEDICINE

## 2019-09-20 PROCEDURE — 3017F COLORECTAL CA SCREEN DOC REV: CPT | Performed by: INTERNAL MEDICINE

## 2019-09-20 PROCEDURE — G8427 DOCREV CUR MEDS BY ELIG CLIN: HCPCS | Performed by: INTERNAL MEDICINE

## 2019-09-20 RX ORDER — FUROSEMIDE 40 MG/1
40 TABLET ORAL 2 TIMES DAILY
Qty: 180 TABLET | Refills: 3 | Status: SHIPPED | OUTPATIENT
Start: 2019-09-20 | End: 2020-03-23 | Stop reason: SDUPTHER

## 2019-09-20 NOTE — LETTER
Family History:  family history includes Cancer in his father; Stroke in his mother. Allergies:  Patient has no known allergies. Review of Systems:   · Constitutional: there has been no unanticipated weight loss. · Eyes: No visual changes   · ENT: No Headaches, hearing loss or vertigo. No mouth sores or sore throat. · Cardiovascular: Reviewed in HPI  · Respiratory: No cough or wheezing, no sputum production. No hematemesis. · Gastrointestinal: No abdominal pain, appetite loss, blood in stools. No change in bowel or bladder habits. · Genitourinary: No nocturia, dysuria, trouble voiding. · Musculoskeletal:  No gait disturbance, weakness or joint complaints. · Integumentary: No rash or pruritis. · Neurological: No headache, change in muscle strength, numbness or tingling. No change in gait, balance, coordination, mood, affect, memory, mentation, behavior. · Psychiatric: No anxiety or depression  · Endocrine: No malaise or fever  · Hematologic/Lymphatic: No abnormal bruising or bleeding, blood clots or swollen lymph nodes. · Allergic/Immunologic: No nasal congestion or hives. Physical Examination:    Vitals:    09/20/19 1625 09/20/19 1631   BP: (!) 170/62 (!) 174/70   Site: Right Upper Arm Right Upper Arm   Position: Sitting Sitting   Cuff Size: Medium Adult Medium Adult   Pulse: 61    SpO2: 95%    Weight: 163 lb (73.9 kg)    Height: 5' 8\" (1.727 m)      Body mass index is 24.78 kg/m².      Wt Readings from Last 3 Encounters:   09/20/19 163 lb (73.9 kg)   02/22/19 162 lb (73.5 kg)   08/31/18 155 lb (70.3 kg)     BP Readings from Last 3 Encounters:   09/20/19 (!) 174/70   02/22/19 132/62   08/31/18 (!) 160/60       Constitutional and General Appearance:  appears stated age  Respiratory:  · Normal excursion and expansion without use of accessory muscles  · Resp Auscultation: Normal breath sounds without dullness  Cardiovascular:  · The apical impulses not displaced

## 2020-01-24 RX ORDER — DOXAZOSIN MESYLATE 4 MG/1
TABLET ORAL
Qty: 90 TABLET | Refills: 3 | Status: SHIPPED | OUTPATIENT
Start: 2020-01-24 | End: 2020-04-23

## 2020-02-03 RX ORDER — ATENOLOL 25 MG/1
TABLET ORAL
Qty: 90 TABLET | Refills: 3 | Status: SHIPPED | OUTPATIENT
Start: 2020-02-03 | End: 2020-02-10 | Stop reason: SDUPTHER

## 2020-02-10 RX ORDER — ATENOLOL 25 MG/1
TABLET ORAL
Qty: 90 TABLET | Refills: 3 | Status: SHIPPED | OUTPATIENT
Start: 2020-02-10 | End: 2021-11-03

## 2020-03-09 RX ORDER — ATORVASTATIN CALCIUM 80 MG/1
TABLET, FILM COATED ORAL
Qty: 90 TABLET | Refills: 3 | Status: SHIPPED | OUTPATIENT
Start: 2020-03-09 | End: 2020-04-23

## 2020-03-23 RX ORDER — BENAZEPRIL HYDROCHLORIDE 40 MG/1
TABLET, FILM COATED ORAL
Qty: 90 TABLET | Refills: 3 | Status: SHIPPED | OUTPATIENT
Start: 2020-03-23 | End: 2021-05-11

## 2020-03-23 RX ORDER — FUROSEMIDE 40 MG/1
40 TABLET ORAL 2 TIMES DAILY
Qty: 180 TABLET | Refills: 3 | Status: SHIPPED | OUTPATIENT
Start: 2020-03-23 | End: 2020-04-23

## 2020-04-23 RX ORDER — FUROSEMIDE 40 MG/1
TABLET ORAL
Qty: 180 TABLET | Refills: 3 | Status: SHIPPED | OUTPATIENT
Start: 2020-04-23 | End: 2020-09-30

## 2020-04-23 RX ORDER — DOXAZOSIN MESYLATE 4 MG/1
TABLET ORAL
Qty: 90 TABLET | Refills: 3 | Status: SHIPPED | OUTPATIENT
Start: 2020-04-23 | End: 2021-06-10

## 2020-04-23 RX ORDER — ATORVASTATIN CALCIUM 80 MG/1
TABLET, FILM COATED ORAL
Qty: 90 TABLET | Refills: 0 | Status: SHIPPED | OUTPATIENT
Start: 2020-04-23 | End: 2020-09-02

## 2020-09-02 RX ORDER — ATORVASTATIN CALCIUM 80 MG/1
TABLET, FILM COATED ORAL
Qty: 90 TABLET | Refills: 0 | Status: SHIPPED | OUTPATIENT
Start: 2020-09-02 | End: 2020-12-02

## 2020-09-30 ENCOUNTER — OFFICE VISIT (OUTPATIENT)
Dept: CARDIOLOGY CLINIC | Age: 75
End: 2020-09-30
Payer: MEDICARE

## 2020-09-30 VITALS
BODY MASS INDEX: 23.49 KG/M2 | OXYGEN SATURATION: 96 % | HEIGHT: 68 IN | DIASTOLIC BLOOD PRESSURE: 64 MMHG | HEART RATE: 55 BPM | WEIGHT: 155 LBS | SYSTOLIC BLOOD PRESSURE: 134 MMHG

## 2020-09-30 PROCEDURE — 1036F TOBACCO NON-USER: CPT | Performed by: INTERNAL MEDICINE

## 2020-09-30 PROCEDURE — G8427 DOCREV CUR MEDS BY ELIG CLIN: HCPCS | Performed by: INTERNAL MEDICINE

## 2020-09-30 PROCEDURE — G8420 CALC BMI NORM PARAMETERS: HCPCS | Performed by: INTERNAL MEDICINE

## 2020-09-30 PROCEDURE — 99214 OFFICE O/P EST MOD 30 MIN: CPT | Performed by: INTERNAL MEDICINE

## 2020-09-30 PROCEDURE — 4040F PNEUMOC VAC/ADMIN/RCVD: CPT | Performed by: INTERNAL MEDICINE

## 2020-09-30 PROCEDURE — 3017F COLORECTAL CA SCREEN DOC REV: CPT | Performed by: INTERNAL MEDICINE

## 2020-09-30 PROCEDURE — 1123F ACP DISCUSS/DSCN MKR DOCD: CPT | Performed by: INTERNAL MEDICINE

## 2020-09-30 RX ORDER — FAMOTIDINE 40 MG/1
40 TABLET, FILM COATED ORAL DAILY
COMMUNITY

## 2020-09-30 RX ORDER — FUROSEMIDE 20 MG/1
20 TABLET ORAL DAILY
Qty: 30 TABLET | Refills: 6
Start: 2020-09-30 | End: 2022-05-31 | Stop reason: SDUPTHER

## 2020-09-30 NOTE — PROGRESS NOTES
[address]   Cardiac Evaluation      Patient: Betty Ibrahim  YOB: 1945  Date: 9/30/20     Chief Complaint   Patient presents with    Coronary Artery Disease    Hyperlipidemia    Hypertension      Referring provider: Heaven Frias MD    History of Present Illness:   Mr. Maxx Sotomayor is seen today for follow up. Cardiac history includes coronary disease, hypertension, and hyperlipidemia. He had angioplasty and stent placement of RCA and LAD in 2007 after a positive stress test and chest pain. Other history includes bladder cancer which is apparently stable. Quit smoking in 2008. I increased his Lasix to 40 mg daily and added Cardura 4 mg nightly. Lasix was then increased to 40mg BID, but he is not taking it. Today, Mr. Maxx Sotomayor states when he took Lasix BID he could not make it to the bathroom. He walks with a walker and is slow to move. He quit taking Lasix some time ago. His legs are edematous; he has been taking the aldactone. He denies any chest pain, palpitations, ZAMORA, dizziness. Past Medical History:  has a past medical history of CAD (coronary artery disease); Arthritis; Hypertension; GERD,Hyperlipidemia. Bladder cancer    Surgical History:   has a past surgical history that includes Coronary angioplasty with stent; hernia repair; Cystoscopy (6-7-10); Colonoscopy; Cystocopy (1/9/12); Cystoscopy (7/23/12); Cystocopy (2/25/13); Cystoscopy (8/26/13); Cystoscopy (4/29/14); Cystocopy (12/15/14); Cystocopy (8/3/2015); other surgical history (Left, 8/28/15); joint replacement; Cystocopy (5/24/16); and Cystocopy (06/04/2017). Social History:   reports that he quit smoking in 2008. He reports that he does not currently drink alcohol or use illicit drugs. Family History:  family history includes Cancer in his father; Stroke in his mother. Allergies:  Patient has no known allergies.      Review of Systems:   · Constitutional: there has been no unanticipated weight loss.  · Eyes: No visual changes   · ENT: No Headaches, hearing loss or vertigo. No mouth sores or sore throat. · Cardiovascular: Reviewed in HPI  · Respiratory: No cough or wheezing, no sputum production. No hematemesis. · Gastrointestinal: No abdominal pain, appetite loss, blood in stools. No change in bowel or bladder habits. · Genitourinary: No nocturia, dysuria, trouble voiding. · Musculoskeletal:  No gait disturbance, weakness or joint complaints. · Integumentary: No rash or pruritis. · Neurological: No headache, change in muscle strength, numbness or tingling. No change in gait, balance, coordination, mood, affect, memory, mentation, behavior. · Psychiatric: No anxiety or depression  · Endocrine: No malaise or fever  · Hematologic/Lymphatic: No abnormal bruising or bleeding, blood clots or swollen lymph nodes. · Allergic/Immunologic: No nasal congestion or hives. Physical Examination:    Vitals:    09/30/20 1533   BP: 134/64   Site: Left Upper Arm   Position: Sitting   Cuff Size: Medium Adult   Pulse: 55   SpO2: 96%   Weight: 155 lb (70.3 kg)   Height: 5' 8\" (1.727 m)     Body mass index is 23.57 kg/m². Wt Readings from Last 3 Encounters:   09/30/20 155 lb (70.3 kg)   09/20/19 163 lb (73.9 kg)   02/22/19 162 lb (73.5 kg)     BP Readings from Last 3 Encounters:   09/30/20 134/64   09/20/19 (!) 174/70   02/22/19 132/62       Constitutional and General Appearance:  appears stated age  Respiratory:  · Normal excursion and expansion without use of accessory muscles  · Resp Auscultation: Normal breath sounds without dullness  Cardiovascular:  · The apical impulses not displaced  · Heart is regular rate and rhythm with normal S1, S2  · The carotid upstroke is normal,  right bruit auscultated.    · JVP is not elevated  · Peripheral pulses are symmetrical  · 1-2+ LLE edema, trace RLE edema  · There is no clubbing, cyanosis of the extremities  · Femoral Arteries: 2+ and equal  · Pedal Pulses: 2+ and equal   Abdomen:  · No masses or tenderness  · Normal bowel sounds  Neurological/Psychiatric:  · Alert and oriented x3  · Moves all extremities well. · Very slow and unsteady gait and uses a cane    Assessment:  1. CAD (coronary artery disease): Stable, no anginal symptoms  Echo 10/18: EF 55-60%, mild MR  GXT leo 6/25/2015> normal myocardial perfusion  GXT Leo 3/5/08> small sized basilar fixed defect consistent with ischemia  Angiogram 8/14/07> Taxus to distal RCA and mid LAD. Not on Plavix due to hematuria from the bladder cancer. Takes 81mg aspirin   2. Other and unspecified hyperlipidemia:  Stable on labs 8/18/20: ; TRIG 89; HDL 48; LDL 91   3. 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 Hypertension -stable   5. Sinus bradycardia, h/o: stable  6. Bladder cancer, Stage I, h/o: Stable, no more hematuria, has flow issues. Has not required chemo as of yet. 7.  Myelomalacia and a spinal cord lesion: has seen Dr Tone Zabala. His pulses are normal.   8.  Pedal edema - his echo was normal.  His LE pulses are normal.  His renal function, liver and thyroid are normal. I suspect his edema is neuropathic and related to the problem in his back. .       Plan:   Discussed diuretics for edema. Will restart Lasix 20mg once daily. Recommend compression hose ( which he has but he states he has great difficulty putting on). . Repeat carotid doppler. FU 6 months. Scribe's attestation: This note was scribed in the presence of Dr Arthur Mcknight MD by Libby Canseco, MATILDE. The scribe's documentation has been prepared under my direction and personally reviewed by me in its entirety. I confirm that the note above accurately reflects all work, treatment, procedures, and medical decision making performed by me.                  Vanesa Bey MD

## 2020-09-30 NOTE — LETTER
[facility] Cardiology [facility] [address]  Phone: [phone]  Fax: [phone]    Fely Cho MD        October 6, 2020     [place], [address] [place] [address]    Patient: Judit Corea  MR Number: [id number]  YOB: 1945  Date of Visit: 9/30/2020    Dear Dr. [place]:    [address]   Cardiac Evaluation      Patient: Judit Corea  YOB: 1945  Date: 9/30/20     Chief Complaint   Patient presents with    Coronary Artery Disease    Hyperlipidemia    Hypertension      Referring provider: [place] MD    History of Present Illness:   Mr. Kade Teran is seen today for follow up. Cardiac history includes coronary disease, hypertension, and hyperlipidemia. He had angioplasty and stent placement of RCA and LAD in 2007 after a positive stress test and chest pain. Other history includes bladder cancer which is apparently stable. Quit smoking in 2008. I increased his Lasix to 40 mg daily and added Cardura 4 mg nightly. Lasix was then increased to 40mg BID, but he is not taking it. Today, Mr. Kade Teran states when he took Lasix BID he could not make it to the bathroom. He walks with a walker and is slow to move. He quit taking Lasix some time ago. His legs are edematous; he has been taking the aldactone. He denies any chest pain, palpitations, ZAMORA, dizziness. Past Medical History:  has a past medical history of CAD (coronary artery disease); Arthritis; Hypertension; GERD,Hyperlipidemia. Bladder cancer    Surgical History:   has a past surgical history that includes Coronary angioplasty with stent; hernia repair; Cystoscopy (6-7-10); Colonoscopy; Cystocopy (1/9/12); Cystoscopy (7/23/12); Cystocopy (2/25/13); Cystoscopy (8/26/13); Cystoscopy (4/29/14); Cystocopy (12/15/14);  Cystocopy (8/3/2015); other surgical history (Left, 8/28/15); joint replacement; Cystocopy (5/24/16); · Resp Auscultation: Normal breath sounds without dullness  Cardiovascular:  · The apical impulses not displaced  · Heart is regular rate and rhythm with normal S1, S2  · The carotid upstroke is normal,  right bruit auscultated. · JVP is not elevated  · Peripheral pulses are symmetrical  · 1-2+ LLE edema, trace RLE edema  · There is no clubbing, cyanosis of the extremities  · Femoral Arteries: 2+ and equal  · Pedal Pulses: 2+ and equal   Abdomen:  · No masses or tenderness  · Normal bowel sounds  Neurological/Psychiatric:  · Alert and oriented x3  · Moves all extremities well. · Very slow and unsteady gait and uses a cane    Assessment:  1. CAD (coronary artery disease): Stable, no anginal symptoms  Echo 10/18: EF 55-60%, mild MR  GXT tamia 6/25/2015> normal myocardial perfusion  GXT Tamia 3/5/08> small sized basilar fixed defect consistent with ischemia  Angiogram 8/14/07> Taxus to distal RCA and mid LAD. Not on Plavix due to hematuria from the bladder cancer. Takes 81mg aspirin   2. Other and unspecified hyperlipidemia:  Stable on labs 8/18/20: ; TRIG 89; HDL 48; LDL 91   3. 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 Hypertension -stable   5. Sinus bradycardia, h/o: stable  6. Bladder cancer, Stage I, h/o: Stable, no more hematuria, has flow issues. Has not required chemo as of yet. 7.  Myelomalacia and a spinal cord lesion: has seen Dr Julio Ac. His pulses are normal.   8.  Pedal edema - his echo was normal.  His LE pulses are normal.  His renal function, liver and thyroid are normal. I suspect his edema is neuropathic and related to the problem in his back. .       Plan:   Discussed diuretics for edema. Will restart Lasix 20mg once daily. Recommend compression hose ( which he has but he states he has great difficulty putting on). . Repeat carotid doppler. FU 6 months. Scribe's attestation: This note was scribed in the presence of Dr Geraldo Villatoro MD by Mike Valentin RN. The scribe's documentation has been prepared under my direction and personally reviewed by me in its entirety. I confirm that the note above accurately reflects all work, treatment, procedures, and medical decision making performed by me. Eamon Carr MD          If you have questions, please do not hesitate to call me. I look forward to following Rojean Felty along with you.     Sincerely,        Zoya Gamez MD

## 2020-11-13 ENCOUNTER — TELEPHONE (OUTPATIENT)
Dept: CARDIOLOGY CLINIC | Age: 75
End: 2020-11-13

## 2020-12-02 RX ORDER — ATORVASTATIN CALCIUM 80 MG/1
TABLET, FILM COATED ORAL
Qty: 90 TABLET | Refills: 0 | Status: SHIPPED | OUTPATIENT
Start: 2020-12-02 | End: 2021-02-05

## 2020-12-11 ENCOUNTER — HOSPITAL ENCOUNTER (OUTPATIENT)
Dept: CARDIOLOGY | Age: 75
Discharge: HOME | End: 2020-12-11
Payer: MEDICARE

## 2020-12-11 PROCEDURE — 93880 EXTRACRANIAL BILAT STUDY: CPT

## 2021-02-05 RX ORDER — ATORVASTATIN CALCIUM 80 MG/1
TABLET, FILM COATED ORAL
Qty: 90 TABLET | Refills: 3 | Status: SHIPPED | OUTPATIENT
Start: 2021-02-05 | End: 2021-12-23

## 2021-02-19 ENCOUNTER — TELEPHONE (OUTPATIENT)
Dept: CARDIOLOGY CLINIC | Age: 76
End: 2021-02-19

## 2021-02-19 NOTE — TELEPHONE ENCOUNTER
Pt sister called stating that pt BP is running low 105/49 with pusle in the 50's.     pls advise thank you     [phone]

## 2021-02-19 NOTE — TELEPHONE ENCOUNTER
Per Dr. Cesilia Morataya cut Benzapril to 20 mg. If systolic pressure is unnder 110 then hold that dose too. Continue to check B/P and call us if he has any more problems.

## 2021-05-11 RX ORDER — BENAZEPRIL HYDROCHLORIDE 40 MG/1
20 TABLET, FILM COATED ORAL DAILY
Qty: 45 TABLET | Refills: 0 | Status: SHIPPED | OUTPATIENT
Start: 2021-05-11 | End: 2021-06-25

## 2021-06-10 RX ORDER — DOXAZOSIN MESYLATE 4 MG/1
TABLET ORAL
Qty: 90 TABLET | Refills: 3 | Status: SHIPPED | OUTPATIENT
Start: 2021-06-10 | End: 2022-03-22

## 2021-06-23 ENCOUNTER — OFFICE VISIT (OUTPATIENT)
Dept: CARDIOLOGY CLINIC | Age: 76
End: 2021-06-23
Payer: MEDICARE

## 2021-06-23 VITALS
OXYGEN SATURATION: 98 % | DIASTOLIC BLOOD PRESSURE: 58 MMHG | WEIGHT: 154 LBS | HEIGHT: 68 IN | HEART RATE: 57 BPM | SYSTOLIC BLOOD PRESSURE: 138 MMHG | BODY MASS INDEX: 23.34 KG/M2

## 2021-06-23 DIAGNOSIS — R00.1 BRADYCARDIA: ICD-10-CM

## 2021-06-23 DIAGNOSIS — I10 ESSENTIAL HYPERTENSION, BENIGN: ICD-10-CM

## 2021-06-23 DIAGNOSIS — E78.49 OTHER HYPERLIPIDEMIA: Primary | ICD-10-CM

## 2021-06-23 DIAGNOSIS — I25.10 CORONARY ARTERY DISEASE INVOLVING NATIVE CORONARY ARTERY OF NATIVE HEART WITHOUT ANGINA PECTORIS: ICD-10-CM

## 2021-06-23 PROCEDURE — 99214 OFFICE O/P EST MOD 30 MIN: CPT | Performed by: INTERNAL MEDICINE

## 2021-06-23 PROCEDURE — 1123F ACP DISCUSS/DSCN MKR DOCD: CPT | Performed by: INTERNAL MEDICINE

## 2021-06-23 PROCEDURE — G8420 CALC BMI NORM PARAMETERS: HCPCS | Performed by: INTERNAL MEDICINE

## 2021-06-23 PROCEDURE — G8427 DOCREV CUR MEDS BY ELIG CLIN: HCPCS | Performed by: INTERNAL MEDICINE

## 2021-06-23 PROCEDURE — 4040F PNEUMOC VAC/ADMIN/RCVD: CPT | Performed by: INTERNAL MEDICINE

## 2021-06-23 PROCEDURE — 1036F TOBACCO NON-USER: CPT | Performed by: INTERNAL MEDICINE

## 2021-06-23 RX ORDER — DONEPEZIL HYDROCHLORIDE 5 MG/1
TABLET, FILM COATED ORAL
COMMUNITY
Start: 2021-06-11

## 2021-06-23 NOTE — PROGRESS NOTES
[facility]   Cardiac Evaluation      Patient: Salvador Mo  YOB: 1945  Date: 6/23/21     Chief Complaint   Patient presents with    Coronary Artery Disease    Hypertension      Referring provider: CT Hills CNP    History of Present Illness:   Mr. Guicho Faulkner is seen today for follow up. Cardiac history includes coronary disease, hypertension, and hyperlipidemia. He had angioplasty and stent placement of RCA and LAD in 2007 after a positive stress test and chest pain. Other history includes bladder cancer which is apparently stable. Quit smoking in 2008. I increased his Lasix to 40 mg daily and added Cardura 4 mg nightly. Lasix is currently at 20mg daily    Today, Mr. Guicho Faulkner is here with his wife. He walks with a walker for balance. Shira Noe denies chest pain, palpitations, ZAMORA, dizziness, or edema. He is working on strengthening his legs. Past Medical History:  has a past medical history of CAD (coronary artery disease); Arthritis; Hypertension; GERD,Hyperlipidemia. Bladder cancer    Surgical History:   has a past surgical history that includes Coronary angioplasty with stent; hernia repair; Cystoscopy (6-7-10); Colonoscopy; Cystocopy (1/9/12); Cystoscopy (7/23/12); Cystocopy (2/25/13); Cystoscopy (8/26/13); Cystoscopy (4/29/14); Cystocopy (12/15/14); Cystocopy (8/3/2015); other surgical history (Left, 8/28/15); joint replacement; Cystocopy (5/24/16); and Cystocopy (06/04/2017). Social History:   reports that he quit smoking in 2008. He reports that he does not currently drink alcohol or use illicit drugs. Family History:  family history includes Cancer in his father; Stroke in his mother. Allergies:  Patient has no known allergies. Review of Systems:   · Constitutional: there has been no unanticipated weight loss. · Eyes: No visual changes   · ENT: No Headaches, hearing loss or vertigo. No mouth sores or sore throat.   · Cardiovascular: Reviewed in HPI  · Respiratory: No cough or wheezing, no sputum production. No hematemesis. · Gastrointestinal: No abdominal pain, appetite loss, blood in stools. No change in bowel or bladder habits. · Genitourinary: No nocturia, dysuria, trouble voiding. · Musculoskeletal:  No gait disturbance, weakness or joint complaints. · Integumentary: No rash or pruritis. · Neurological: No headache, change in muscle strength, numbness or tingling. No change in gait, balance, coordination, mood, affect, memory, mentation, behavior. · Psychiatric: No anxiety or depression  · Endocrine: No malaise or fever  · Hematologic/Lymphatic: No abnormal bruising or bleeding, blood clots or swollen lymph nodes. · Allergic/Immunologic: No nasal congestion or hives. Physical Examination:    Vitals:    06/23/21 1400 06/23/21 1407   BP: (!) 138/58 (!) 138/58   Site: Right Upper Arm Right Upper Arm   Position: Sitting Sitting   Cuff Size: Medium Adult Medium Adult   Pulse: 57    SpO2: 98%    Weight: 154 lb (69.9 kg)    Height: 5' 8\" (1.727 m)      Body mass index is 23.42 kg/m². Wt Readings from Last 3 Encounters:   06/23/21 154 lb (69.9 kg)   09/30/20 155 lb (70.3 kg)   09/20/19 163 lb (73.9 kg)     BP Readings from Last 3 Encounters:   06/23/21 (!) 138/58   09/30/20 134/64   09/20/19 (!) 174/70       Constitutional and General Appearance:  appears stated age  Respiratory:  · Normal excursion and expansion without use of accessory muscles  · Resp Auscultation: Normal breath sounds without dullness  Cardiovascular:  · The apical impulses not displaced  · Heart is regular rate and rhythm with normal S1, S2  · The carotid upstroke is normal,  right bruit auscultated.    · JVP is not elevated  · Peripheral pulses are symmetrical  · 1+ LLE edema, 1+ RLE edema  · There is no clubbing, cyanosis of the extremities  · Femoral Arteries: 2+ and equal  · Pedal Pulses: 2+ and equal   Abdomen:  · No masses or tenderness  · Normal bowel

## 2021-06-25 RX ORDER — BENAZEPRIL HYDROCHLORIDE 40 MG/1
20 TABLET, FILM COATED ORAL DAILY
Qty: 45 TABLET | Refills: 0 | Status: SHIPPED | OUTPATIENT
Start: 2021-06-25 | End: 2021-07-20

## 2021-06-25 NOTE — TELEPHONE ENCOUNTER
Prescription refill    Last OV:6-23-21    Last Refill:5-11-21    Labs:4-9-21 CMP    Future Appt: none

## 2021-07-21 RX ORDER — BENAZEPRIL HYDROCHLORIDE 40 MG/1
20 TABLET, FILM COATED ORAL DAILY
Qty: 45 TABLET | Refills: 3 | Status: SHIPPED | OUTPATIENT
Start: 2021-07-21 | End: 2021-11-23

## 2021-08-09 RX ORDER — BENAZEPRIL HYDROCHLORIDE 40 MG/1
20 TABLET, FILM COATED ORAL DAILY
Qty: 135 TABLET | Refills: 1 | OUTPATIENT
Start: 2021-08-09

## 2021-09-07 ENCOUNTER — TELEPHONE (OUTPATIENT)
Dept: CARDIOLOGY CLINIC | Age: 76
End: 2021-09-07

## 2021-09-07 NOTE — TELEPHONE ENCOUNTER
Pt daughter called stating pt was on Flomax, Dr Maine Castellano took the pt off of the Flomax because he was on doxazosin, Dr Maine Castellano wanted to know if  [facility] would want increase the doxazosin to 8 mg, since he took him off the Flomax?     please advise thank you     Beatris Donis  [phone]

## 2021-10-05 ENCOUNTER — TELEPHONE (OUTPATIENT)
Dept: CARDIOLOGY CLINIC | Age: 76
End: 2021-10-05

## 2021-10-05 NOTE — TELEPHONE ENCOUNTER
Daughter calling to make sure this patient is taking his atenolol 50mg correctly. The bottle says take 1/2 tab but daughter thinks he is taking a whole tablet .

## 2021-10-06 NOTE — TELEPHONE ENCOUNTER
I spoke with Aida Butler and he has been taking 50 mg for awhile of Atenolol. She said his B/P is ok but his HR are running in the 50's and sometimes below.  Should pt be advised to take the 25 mg?

## 2021-11-03 ENCOUNTER — OFFICE VISIT (OUTPATIENT)
Dept: CARDIOLOGY CLINIC | Age: 76
End: 2021-11-03
Payer: MEDICARE

## 2021-11-03 DIAGNOSIS — R00.1 BRADYCARDIA: ICD-10-CM

## 2021-11-03 DIAGNOSIS — I10 ESSENTIAL HYPERTENSION: ICD-10-CM

## 2021-11-03 DIAGNOSIS — I25.10 CORONARY ARTERY DISEASE INVOLVING NATIVE CORONARY ARTERY OF NATIVE HEART WITHOUT ANGINA PECTORIS: Primary | ICD-10-CM

## 2021-11-03 DIAGNOSIS — I65.23 BILATERAL CAROTID ARTERY STENOSIS: ICD-10-CM

## 2021-11-03 DIAGNOSIS — E78.5 HYPERLIPIDEMIA, UNSPECIFIED HYPERLIPIDEMIA TYPE: ICD-10-CM

## 2021-11-03 PROCEDURE — 1036F TOBACCO NON-USER: CPT | Performed by: NURSE PRACTITIONER

## 2021-11-03 PROCEDURE — 99214 OFFICE O/P EST MOD 30 MIN: CPT | Performed by: NURSE PRACTITIONER

## 2021-11-03 PROCEDURE — G8420 CALC BMI NORM PARAMETERS: HCPCS | Performed by: NURSE PRACTITIONER

## 2021-11-03 PROCEDURE — G8427 DOCREV CUR MEDS BY ELIG CLIN: HCPCS | Performed by: NURSE PRACTITIONER

## 2021-11-03 PROCEDURE — 93000 ELECTROCARDIOGRAM COMPLETE: CPT | Performed by: NURSE PRACTITIONER

## 2021-11-03 PROCEDURE — 1123F ACP DISCUSS/DSCN MKR DOCD: CPT | Performed by: NURSE PRACTITIONER

## 2021-11-03 PROCEDURE — 4040F PNEUMOC VAC/ADMIN/RCVD: CPT | Performed by: NURSE PRACTITIONER

## 2021-11-03 PROCEDURE — G8484 FLU IMMUNIZE NO ADMIN: HCPCS | Performed by: NURSE PRACTITIONER

## 2021-11-03 RX ORDER — ATENOLOL 25 MG/1
12.5 TABLET ORAL DAILY
Qty: 90 TABLET | Refills: 3 | Status: SHIPPED | OUTPATIENT
Start: 2021-11-03 | End: 2021-12-15

## 2021-11-03 NOTE — PROGRESS NOTES
[facility]   Cardiac Evaluation      Patient: Fiona Del Cid  YOB: 1945  Date: 11/3/21     Chief Complaint   Patient presents with    Follow-up    Coronary Artery Disease    Hypertension    Hyperlipidemia      Referring provider: CT García CNP    History of Present Illness:   Mr. Jonathan Fontenot is seen today for follow up. Cardiac history includes coronary disease, hypertension, and hyperlipidemia. He had angioplasty and stent placement of RCA and LAD in 2007 after a positive stress test and chest pain. Other history includes bladder cancer which is apparently stable. Quit smoking in 2008. I increased his Lasix to 40 mg daily and added Cardura 4 mg nightly. Lasix is currently at 20mg daily   Has been taking 50 mg of atenolol instead of 25. Heart rates have been <50 at home. Today, Mr Jonathan Fontenot complains continued dizziness, despite cutting atenolol back to 25 mg ~3 weeks ago. Thinks dizziness may be exacerbated by back issues. He uses a walker as he has been having balance issues. Complains of fatigue. Denies chest pain, shortness of breath, palpitations. Past Medical History:  has a past medical history of CAD (coronary artery disease); Arthritis; Hypertension; GERD,Hyperlipidemia. Bladder cancer    Surgical History:   has a past surgical history that includes Coronary angioplasty with stent; hernia repair; Cystoscopy (6-7-10); Colonoscopy; Cystocopy (1/9/12); Cystoscopy (7/23/12); Cystocopy (2/25/13); Cystoscopy (8/26/13); Cystoscopy (4/29/14); Cystocopy (12/15/14); Cystocopy (8/3/2015); other surgical history (Left, 8/28/15); joint replacement; Cystocopy (5/24/16); and Cystocopy (06/04/2017). Social History:   reports that he quit smoking in 2008. He reports that he does not currently drink alcohol or use illicit drugs. Family History:  family history includes Cancer in his father; Stroke in his mother. Allergies:  Patient has no known allergies. Review of Systems:   · Constitutional: there has been no unanticipated weight loss. · Eyes: No visual changes   · ENT: No Headaches, hearing loss or vertigo. No mouth sores or sore throat. · Cardiovascular: Reviewed in HPI  · Respiratory: No cough or wheezing, no sputum production. No hematemesis. · Gastrointestinal: No abdominal pain, appetite loss, blood in stools. No change in bowel or bladder habits. · Genitourinary: No nocturia, dysuria, trouble voiding. · Musculoskeletal:  No gait disturbance, weakness or joint complaints. C/o balance issues     · Integumentary: No rash or pruritis. · Neurological: No headache, change in muscle strength, numbness or tingling. No change in gait, balance, coordination, mood, affect, memory, mentation, behavior. · Psychiatric: No anxiety or depression  · Endocrine: No malaise or fever  · Hematologic/Lymphatic: No abnormal bruising or bleeding, blood clots or swollen lymph nodes. · Allergic/Immunologic: No nasal congestion or hives. Physical Examination:    Vitals:    11/03/21 1309   Height: 5' 8\" (1.727 m)     Body mass index is 23.42 kg/m². Wt Readings from Last 3 Encounters:   11/03/21 154 lb (69.9 kg)   06/23/21 154 lb (69.9 kg)   09/30/20 155 lb (70.3 kg)     BP Readings from Last 3 Encounters:   11/08/21 (!) 124/54   06/23/21 (!) 138/58   09/30/20 134/64       Constitutional and General Appearance:  appears stated age  Respiratory:  · Normal excursion and expansion without use of accessory muscles  · Resp Auscultation: Normal breath sounds without dullness  Cardiovascular:  · The apical impulses not displaced  · Heart is regular rate and rhythm with normal S1, S2  · The carotid upstroke is normal,  right bruit auscultated.    · JVP is not elevated  · Peripheral pulses are symmetrical  · 1+ LLE edema, 1+ RLE edema  · There is no clubbing, cyanosis of the extremities  · Femoral Arteries: 2+ and equal  · Pedal Pulses: 2+ and equal   Abdomen:  · No masses or tenderness  · Normal bowel sounds  Neurological/Psychiatric:  · Alert and oriented x3  · Moves all extremities well. · Very slow and unsteady gait and uses a cane    Assessment:  1. CAD (coronary artery disease): Stable, no anginal symptoms  Echo 10/18: EF 55-60%, mild MR  GXT leo 6/25/2015> normal myocardial perfusion  GXT Leo 3/5/08> small sized basilar fixed defect consistent with ischemia  Angiogram 8/14/07> Taxus to distal RCA and mid LAD. Not on Plavix due to hematuria from the bladder cancer. Takes 81mg aspirin   2. Other and unspecified hyperlipidemia:  Stable on labs 8/18/20: ; TRIG 89; HDL 48; LDL 91, will repeat   3. 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 Hypertension -stable   5. Sinus bradycardia, h/o: HR 53 on EKG today. Had accidentally been taking 50 mg of atenolol instead of 25. Complains of dizziness and fatigue. 6.  Bladder cancer, Stage I, h/o: Stable, no more hematuria, has flow issues. Has not required chemo as of yet. 7.  Myelomalacia and a spinal cord lesion: has seen Dr Carolina Hernandez and is now seeing another neurologist.     8.  Pedal edema - his echo was normal.  His LE pulses are normal.  His renal function, liver and thyroid are normal. I suspect his edema is neuropathic and related to the problem in his back      Plan: EKG today shows HR of 53 after decreasing atenolol back to 25. Decrease atenolol to 12.5 mg daily as patient c/o fatigue and dizziness. Continue checking BP/HR and call heart rate consistently in the 50s. Appointment already scheduled with Dr Cat Traore in December.

## 2021-11-08 VITALS
OXYGEN SATURATION: 96 % | SYSTOLIC BLOOD PRESSURE: 124 MMHG | WEIGHT: 154 LBS | HEART RATE: 54 BPM | DIASTOLIC BLOOD PRESSURE: 54 MMHG | HEIGHT: 68 IN | BODY MASS INDEX: 23.34 KG/M2

## 2021-11-23 RX ORDER — BENAZEPRIL HYDROCHLORIDE 40 MG/1
20 TABLET, FILM COATED ORAL DAILY
Qty: 135 TABLET | Refills: 1 | Status: SHIPPED | OUTPATIENT
Start: 2021-11-23 | End: 2021-11-23 | Stop reason: SDUPTHER

## 2021-11-23 RX ORDER — BENAZEPRIL HYDROCHLORIDE 40 MG/1
20 TABLET, FILM COATED ORAL DAILY
Qty: 45 TABLET | Refills: 1 | Status: SHIPPED | OUTPATIENT
Start: 2021-11-23 | End: 2021-12-15

## 2021-12-08 DIAGNOSIS — I65.23 BILATERAL CAROTID ARTERY STENOSIS: Primary | ICD-10-CM

## 2021-12-10 ENCOUNTER — HOSPITAL ENCOUNTER (OUTPATIENT)
Dept: CARDIOLOGY | Age: 76
Discharge: HOME | End: 2021-12-10
Payer: MEDICARE

## 2021-12-10 DIAGNOSIS — I65.23 BILATERAL CAROTID ARTERY STENOSIS: ICD-10-CM

## 2021-12-10 PROCEDURE — 93880 EXTRACRANIAL BILAT STUDY: CPT

## 2021-12-14 ENCOUNTER — TELEPHONE (OUTPATIENT)
Dept: CARDIOLOGY CLINIC | Age: 76
End: 2021-12-14

## 2021-12-14 NOTE — TELEPHONE ENCOUNTER
----- Message from CT Dumont CNP sent at 12/14/2021  8:30 AM EST -----  Carotids are unchanged. Mild stenosis on right and moderate on left.

## 2021-12-14 NOTE — TELEPHONE ENCOUNTER
Call placed to patient who was not available for message below. Per Eileen Lazaro did leave message pertaining to results on his voicemail. Patient encouraged to call the office with any questions.

## 2021-12-15 ENCOUNTER — OFFICE VISIT (OUTPATIENT)
Dept: CARDIOLOGY CLINIC | Age: 76
End: 2021-12-15
Payer: MEDICARE

## 2021-12-15 VITALS
SYSTOLIC BLOOD PRESSURE: 138 MMHG | DIASTOLIC BLOOD PRESSURE: 62 MMHG | OXYGEN SATURATION: 97 % | BODY MASS INDEX: 23.34 KG/M2 | HEART RATE: 61 BPM | WEIGHT: 154 LBS | HEIGHT: 68 IN

## 2021-12-15 DIAGNOSIS — I10 ESSENTIAL HYPERTENSION, BENIGN: ICD-10-CM

## 2021-12-15 DIAGNOSIS — I25.10 CORONARY ARTERY DISEASE INVOLVING NATIVE CORONARY ARTERY OF NATIVE HEART WITHOUT ANGINA PECTORIS: ICD-10-CM

## 2021-12-15 DIAGNOSIS — R00.1 BRADYCARDIA: ICD-10-CM

## 2021-12-15 DIAGNOSIS — E78.49 OTHER HYPERLIPIDEMIA: Primary | ICD-10-CM

## 2021-12-15 DIAGNOSIS — I65.23 BILATERAL CAROTID ARTERY STENOSIS: ICD-10-CM

## 2021-12-15 PROCEDURE — 1036F TOBACCO NON-USER: CPT | Performed by: INTERNAL MEDICINE

## 2021-12-15 PROCEDURE — G8420 CALC BMI NORM PARAMETERS: HCPCS | Performed by: INTERNAL MEDICINE

## 2021-12-15 PROCEDURE — 1123F ACP DISCUSS/DSCN MKR DOCD: CPT | Performed by: INTERNAL MEDICINE

## 2021-12-15 PROCEDURE — G8427 DOCREV CUR MEDS BY ELIG CLIN: HCPCS | Performed by: INTERNAL MEDICINE

## 2021-12-15 PROCEDURE — 99214 OFFICE O/P EST MOD 30 MIN: CPT | Performed by: INTERNAL MEDICINE

## 2021-12-15 PROCEDURE — 4040F PNEUMOC VAC/ADMIN/RCVD: CPT | Performed by: INTERNAL MEDICINE

## 2021-12-15 PROCEDURE — G8484 FLU IMMUNIZE NO ADMIN: HCPCS | Performed by: INTERNAL MEDICINE

## 2021-12-15 RX ORDER — BENAZEPRIL HYDROCHLORIDE 40 MG/1
40 TABLET, FILM COATED ORAL DAILY
Qty: 90 TABLET | Refills: 3
Start: 2021-12-15 | End: 2022-02-21

## 2021-12-15 NOTE — PROGRESS NOTES
[address]   Cardiac Evaluation      Patient: Carlito Woodard  YOB: 1945  Date: 12/15/21     Chief Complaint   Patient presents with    Coronary Artery Disease    Hypertension    Hyperlipidemia      Referring provider: CT Vidales CNP    History of Present Illness:   Mr. Tony Martin is seen today for follow up. Cardiac history includes coronary disease, hypertension, and hyperlipidemia. He had angioplasty and stent placement of RCA and LAD in 2007 after a positive stress test and chest pain. Other history includes bladder cancer which is apparently stable. Quit smoking in 2008. I increased his Lasix to 40 mg daily and added Cardura 4 mg nightly. Lasix is currently at 20mg daily   Had been taking 50 mg of atenolol instead of 25. Heart rates have been <50 at home. He saw NPKA and HR was 53. Atenolol was decreased to 12.5mg daily. Today, Mr Tony Martin presents with his wife. He states he is ok. Bro Pillow denies chest pain, palpitations, ZAMORA, dizziness. His main problem is his gait. He walks with a walker. They are asking if he should increase Doxazosin since he is not taking Flomax anymore. Past Medical History:  has a past medical history of CAD (coronary artery disease); Arthritis; Hypertension; GERD,Hyperlipidemia. Bladder cancer    Surgical History:   has a past surgical history that includes Coronary angioplasty with stent; hernia repair; Cystoscopy (6-7-10); Colonoscopy; Cystocopy (1/9/12); Cystoscopy (7/23/12); Cystocopy (2/25/13); Cystoscopy (8/26/13); Cystoscopy (4/29/14); Cystocopy (12/15/14); Cystocopy (8/3/2015); other surgical history (Left, 8/28/15); joint replacement; Cystocopy (5/24/16); and Cystocopy (06/04/2017). Social History:   reports that he quit smoking in 2008. He reports that he does not currently drink alcohol or use illicit drugs. Family History:  family history includes Cancer in his father; Stroke in his mother.      Allergies:  Patient has no known allergies. Review of Systems:   · Constitutional: there has been no unanticipated weight loss. · Eyes: No visual changes   · ENT: No Headaches, hearing loss or vertigo. No mouth sores or sore throat. · Cardiovascular: Reviewed in HPI  · Respiratory: No cough or wheezing, no sputum production. No hematemesis. · Gastrointestinal: No abdominal pain, appetite loss, blood in stools. No change in bowel or bladder habits. · Genitourinary: No nocturia, dysuria, trouble voiding. · Musculoskeletal:  No gait disturbance, weakness or joint complaints. C/o balance issues     · Integumentary: No rash or pruritis. · Neurological: No headache, change in muscle strength, numbness or tingling. No change in gait, balance, coordination, mood, affect, memory, mentation, behavior. · Psychiatric: No anxiety or depression  · Endocrine: No malaise or fever  · Hematologic/Lymphatic: No abnormal bruising or bleeding, blood clots or swollen lymph nodes. · Allergic/Immunologic: No nasal congestion or hives. Physical Examination:    Vitals:    12/15/21 1401   BP: 138/62   Site: Left Upper Arm   Position: Sitting   Cuff Size: Medium Adult   Pulse: 61   SpO2: 97%   Weight: 154 lb (69.9 kg)   Height: 5' 8\" (1.727 m)     Body mass index is 23.42 kg/m². Wt Readings from Last 3 Encounters:   12/15/21 154 lb (69.9 kg)   11/03/21 154 lb (69.9 kg)   06/23/21 154 lb (69.9 kg)     BP Readings from Last 3 Encounters:   12/15/21 138/62   11/08/21 (!) 124/54   06/23/21 (!) 138/58       Constitutional and General Appearance:  appears stated age  Respiratory:  · Normal excursion and expansion without use of accessory muscles  · Resp Auscultation: Normal breath sounds without dullness  Cardiovascular:  · The apical impulses not displaced  · Heart is regular rate and rhythm with normal S1, S2  · The carotid upstroke is normal,  right bruit auscultated.    · JVP is not elevated  · Peripheral pulses are symmetrical  · 1+ LLE edema, 1+ RLE edema  · There is no clubbing, cyanosis of the extremities  · Femoral Arteries: 2+ and equal  · Pedal Pulses: 2+ and equal   Abdomen:  · No masses or tenderness  · Normal bowel sounds  Neurological/Psychiatric:  · Alert and oriented x3  · Moves all extremities well. · Very slow and unsteady gait and uses a cane    Assessment:  1. CAD (coronary artery disease): Stable, no anginal symptoms  Echo 10/18: EF 55-60%, mild MR  GXT leo 6/25/2015> normal myocardial perfusion  GXT Leo 3/5/08> small sized basilar fixed defect consistent with ischemia  Angiogram 8/14/07> Taxus to distal RCA and mid LAD. Not on Plavix due to hematuria from the bladder cancer. Takes 81mg aspirin   2. Other and unspecified hyperlipidemia:  Stable on labs 6/28/21: ; TRIG 99; HDL 44; LDL 85   3. 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 Hypertension -stable   5. Sinus bradycardia, h/o: HR 53 on EKG 11/3/21. Had accidentally been taking 50 mg of atenolol instead of 25. Will stop Atenolol  6. Bladder cancer, Stage I, h/o: Stable, no more hematuria, has flow issues. Has not required chemo as of yet. 7.  Myelomalacia and a spinal cord lesion: has seen Dr Shay  and is now seeing another neurologist.     8.  Pedal edema - his echo was normal.  His LE pulses are normal.  His renal function, liver and thyroid are normal. I suspect his edema is neuropathic and related to the problem in his back  BUN/creat 23/1.2 on labs 6/28/21    Plan:   Discontinue Atenolol. Continue other meds for now. FU 6 months. I spoke to Dr Leigh Ann Stroud who states he should not be on flomax and continue doxazosin 4 or 8 mg. I will keep him on 4. Scrshena's attestation: This note was scribed in the presence of Dr Rani Raya MD by Nanette Mortensen RN. The scribe's documentation has been prepared under my direction and personally reviewed by me in its entirety. I confirm that the note above accurately reflects all work, treatment, procedures, and medical decision making performed by me.

## 2021-12-23 RX ORDER — ATORVASTATIN CALCIUM 80 MG/1
TABLET, FILM COATED ORAL
Qty: 90 TABLET | Refills: 3 | Status: SHIPPED | OUTPATIENT
Start: 2021-12-23

## 2022-02-21 RX ORDER — BENAZEPRIL HYDROCHLORIDE 40 MG/1
40 TABLET, FILM COATED ORAL DAILY
Qty: 90 TABLET | Refills: 1 | Status: SHIPPED | OUTPATIENT
Start: 2022-02-21 | End: 2022-07-28 | Stop reason: SDUPTHER

## 2022-02-21 NOTE — TELEPHONE ENCOUNTER
Spoke w/ pt's daughter. He is taking Benazepril 40mg daily, not 1/2 (20mg) daily. She states he has always taken this, our med list reflected 40mg daily. She states his b/p is controlled at 110-116/60's-70's.

## 2022-03-22 RX ORDER — DOXAZOSIN MESYLATE 4 MG/1
TABLET ORAL
Qty: 90 TABLET | Refills: 3 | Status: SHIPPED | OUTPATIENT
Start: 2022-03-22

## 2022-05-31 ENCOUNTER — OFFICE VISIT (OUTPATIENT)
Dept: CARDIOLOGY CLINIC | Age: 77
End: 2022-05-31
Payer: MEDICARE

## 2022-05-31 VITALS
HEART RATE: 80 BPM | OXYGEN SATURATION: 98 % | SYSTOLIC BLOOD PRESSURE: 138 MMHG | DIASTOLIC BLOOD PRESSURE: 64 MMHG | BODY MASS INDEX: 22.58 KG/M2 | HEIGHT: 68 IN | WEIGHT: 149 LBS

## 2022-05-31 DIAGNOSIS — E78.49 OTHER HYPERLIPIDEMIA: Primary | ICD-10-CM

## 2022-05-31 DIAGNOSIS — I10 ESSENTIAL HYPERTENSION, BENIGN: ICD-10-CM

## 2022-05-31 DIAGNOSIS — R00.1 BRADYCARDIA: ICD-10-CM

## 2022-05-31 DIAGNOSIS — I25.10 CORONARY ARTERY DISEASE INVOLVING NATIVE CORONARY ARTERY OF NATIVE HEART WITHOUT ANGINA PECTORIS: ICD-10-CM

## 2022-05-31 PROCEDURE — 99214 OFFICE O/P EST MOD 30 MIN: CPT | Performed by: INTERNAL MEDICINE

## 2022-05-31 PROCEDURE — G8427 DOCREV CUR MEDS BY ELIG CLIN: HCPCS | Performed by: INTERNAL MEDICINE

## 2022-05-31 PROCEDURE — G8420 CALC BMI NORM PARAMETERS: HCPCS | Performed by: INTERNAL MEDICINE

## 2022-05-31 PROCEDURE — 1123F ACP DISCUSS/DSCN MKR DOCD: CPT | Performed by: INTERNAL MEDICINE

## 2022-05-31 PROCEDURE — 1036F TOBACCO NON-USER: CPT | Performed by: INTERNAL MEDICINE

## 2022-05-31 RX ORDER — VENLAFAXINE HYDROCHLORIDE 75 MG/1
CAPSULE, EXTENDED RELEASE ORAL
COMMUNITY
Start: 2022-04-04

## 2022-05-31 RX ORDER — FUROSEMIDE 20 MG/1
20 TABLET ORAL DAILY
Qty: 30 TABLET | Refills: 6 | Status: SHIPPED | OUTPATIENT
Start: 2022-05-31 | End: 2022-06-23

## 2022-05-31 NOTE — PROGRESS NOTES
[address]   Cardiac Evaluation      Patient: Ney Hills  YOB: 1945  Date: 5/31/22     Chief Complaint   Patient presents with    Coronary Artery Disease    Hyperlipidemia    Hypertension      Referring provider: CT Arora CNP    History of Present Illness:   Mr. Aaron Guzman is seen today for follow up. Cardiac history includes coronary disease, hypertension, and hyperlipidemia. He had angioplasty and stent placement of RCA and LAD in 2007 after a positive stress test and chest pain. Other history includes bladder cancer which is apparently stable. Quit smoking in 2008. I increased his Lasix to 40 mg daily and added Cardura 4 mg nightly. Lasix is currently at 20mg daily   Had been taking 50 mg of atenolol instead of 25. Heart rates have been <50 at home. He saw NPKA and HR was 53. Atenolol was decreased to 12.5mg daily. He is no longer taking Atenolol. Today, Mr Aaron Guzman presents with his wife. He continues to walk with a walker. He wants to stop Aldactone and double Lasix so that he only takes 1 diuretic daily. He states he urinates too much on both. Luke Swan has chronic edema in his feet from a neuropathic problem. Percilla Drop denies chest pain, palpitations, ZAMORA, or dizziness. Past Medical History:  has a past medical history of CAD (coronary artery disease); Arthritis; Hypertension; GERD,Hyperlipidemia. Bladder cancer    Surgical History:   has a past surgical history that includes Coronary angioplasty with stent; hernia repair; Cystoscopy (6-7-10); Colonoscopy; Cystocopy (1/9/12); Cystoscopy (7/23/12); Cystocopy (2/25/13); Cystoscopy (8/26/13); Cystoscopy (4/29/14); Cystocopy (12/15/14); Cystocopy (8/3/2015); other surgical history (Left, 8/28/15); joint replacement; Cystocopy (5/24/16); and Cystocopy (06/04/2017). Social History:   reports that he quit smoking in 2008. He reports that he does not currently drink alcohol or use illicit drugs.      Family History:  family history includes Cancer in his father; Stroke in his mother. Allergies:  Patient has no known allergies. Review of Systems:   · Constitutional: there has been no unanticipated weight loss. · Eyes: No visual changes   · ENT: No Headaches, hearing loss or vertigo. No mouth sores or sore throat. · Cardiovascular: Reviewed in HPI  · Respiratory: No cough or wheezing, no sputum production. No hematemesis. · Gastrointestinal: No abdominal pain, appetite loss, blood in stools. No change in bowel or bladder habits. · Genitourinary: No nocturia, dysuria, trouble voiding. · Musculoskeletal:  No gait disturbance, weakness or joint complaints. C/o balance issues     · Integumentary: No rash or pruritis. · Neurological: No headache, change in muscle strength, numbness or tingling. No change in gait, balance, coordination, mood, affect, memory, mentation, behavior. · Psychiatric: No anxiety or depression  · Endocrine: No malaise or fever  · Hematologic/Lymphatic: No abnormal bruising or bleeding, blood clots or swollen lymph nodes. · Allergic/Immunologic: No nasal congestion or hives. Physical Examination:    Vitals:    05/31/22 1357   BP: 138/64   Site: Left Upper Arm   Position: Sitting   Cuff Size: Medium Adult   Pulse: 80   SpO2: 98%   Weight: 149 lb (67.6 kg)   Height: 5' 8\" (1.727 m)     Body mass index is 22.66 kg/m².      Wt Readings from Last 3 Encounters:   05/31/22 149 lb (67.6 kg)   12/15/21 154 lb (69.9 kg)   11/03/21 154 lb (69.9 kg)     BP Readings from Last 3 Encounters:   05/31/22 138/64   12/15/21 138/62   11/08/21 (!) 124/54       Constitutional and General Appearance:  appears stated age  Respiratory:  · Normal excursion and expansion without use of accessory muscles  · Resp Auscultation: Normal breath sounds without dullness  Cardiovascular:  · The apical impulses not displaced  · Heart is regular rate and rhythm with normal S1, S2  · The carotid upstroke is normal, right bruit auscultated. · JVP is not elevated  · Peripheral pulses are symmetrical  · 1+ LLE edema, 1+ RLE edema  · There is no clubbing, cyanosis of the extremities  · Femoral Arteries: 2+ and equal  · Pedal Pulses: 2+ and equal   Abdomen:  · No masses or tenderness  · Normal bowel sounds  Neurological/Psychiatric:  · Alert and oriented x3  · Moves all extremities well. · Very slow and unsteady gait and uses a cane    Assessment:  1. CAD (coronary artery disease): Stable, no anginal symptoms  Echo 10/18: EF 55-60%, mild MR  GXT leo 6/25/2015> normal myocardial perfusion  GXT Leo 3/5/08> small sized basilar fixed defect consistent with ischemia  Angiogram 8/14/07> Taxus to distal RCA and mid LAD. Not on Plavix due to hematuria from the bladder cancer. Takes 81mg aspirin   2. Other and unspecified hyperlipidemia:  Stable on labs 6/28/21: ; TRIG 99; HDL 44; LDL 85   3. 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 Hypertension -stable   5. Sinus bradycardia, h/o: HR 53 on EKG 11/3/21. Had accidentally been taking 50 mg of atenolol instead of 25. Atenolol has been discontinued   6. Bladder cancer, Stage I, h/o: Stable, no more hematuria, has flow issues. Has not required chemo as of yet. 7.  Myelomalacia and a spinal cord lesion: has seen Dr Eladio Alarcon and is now seeing another neurologist.     8.  Pedal edema - his echo was normal.  His LE pulses are normal.  His renal function, liver and thyroid are normal. I suspect his edema is neuropathic and related to the problem in his back  BUN/creat 23/1.2 on labs 6/28/21      Plan:   He can stop Aldactone, but advised to continue Lasix 20mg daily. If he any increased edema or SOB will call. FU 6 months. Broibe's attestation:  This note was scribed in the presence of Dr Marilia Ponce MD by Sushma Smith RN. The scribe's documentation has been prepared under my direction and personally reviewed by me in its entirety. I confirm that the note above accurately reflects all work, treatment, procedures, and medical decision making performed by me.

## 2022-06-23 RX ORDER — FUROSEMIDE 20 MG/1
TABLET ORAL
Qty: 30 TABLET | Refills: 6 | Status: SHIPPED | OUTPATIENT
Start: 2022-06-23 | End: 2022-09-30

## 2022-07-28 DIAGNOSIS — E78.49 OTHER HYPERLIPIDEMIA: Primary | ICD-10-CM

## 2022-07-28 RX ORDER — BENAZEPRIL HYDROCHLORIDE 40 MG/1
40 TABLET, FILM COATED ORAL DAILY
Qty: 90 TABLET | Refills: 1 | Status: SHIPPED | OUTPATIENT
Start: 2022-07-28

## 2022-07-28 NOTE — TELEPHONE ENCOUNTER
Refill was requested from pharmacy. Patient is up to date with appointments and lab work. 5/31/2022 OV with [place].    12/1/2022 next OV with [place]     Spoke to the daugher and let her know he is due for labs. Order were mailed to pt.

## 2022-08-03 LAB
A/G RATIO: 1.5 (ref 1.2–2.2)
ALBUMIN SERPL-MCNC: 4 G/DL (ref 3.7–4.7)
ALP BLD-CCNC: 81 IU/L (ref 44–121)
ALT SERPL-CCNC: 17 IU/L (ref 0–44)
AST SERPL-CCNC: 23 IU/L (ref 0–40)
BILIRUB SERPL-MCNC: 0.3 MG/DL (ref 0–1.2)
BUN / CREAT RATIO: 19 (ref 10–24)
BUN BLDV-MCNC: 26 MG/DL (ref 8–27)
CALCIUM SERPL-MCNC: 9.3 MG/DL (ref 8.6–10.2)
CHLORIDE BLD-SCNC: 105 MMOL/L (ref 96–106)
CHOLESTEROL, TOTAL: 151 MG/DL (ref 100–199)
CO2: 24 MMOL/L (ref 20–29)
COMMENT: NORMAL
CREAT SERPL-MCNC: 1.35 MG/DL (ref 0.76–1.27)
ESTIMATED GLOMERULAR FILTRATION RATE CREATININE EQUATION: 54 ML/MIN/1.73
GLOBULIN: 2.6 G/DL (ref 1.5–4.5)
GLUCOSE BLD-MCNC: 90 MG/DL (ref 65–99)
HDLC SERPL-MCNC: 48 MG/DL
LDL CHOLESTEROL CALCULATED: 88 MG/DL (ref 0–99)
POTASSIUM SERPL-SCNC: 4.5 MMOL/L (ref 3.5–5.2)
SODIUM BLD-SCNC: 142 MMOL/L (ref 134–144)
TOTAL PROTEIN: 6.6 G/DL (ref 6–8.5)
TRIGL SERPL-MCNC: 77 MG/DL (ref 0–149)
VLDLC SERPL CALC-MCNC: 15 MG/DL (ref 5–40)

## 2022-09-30 RX ORDER — FUROSEMIDE 20 MG/1
TABLET ORAL
Qty: 90 TABLET | Refills: 2 | Status: SHIPPED | OUTPATIENT
Start: 2022-09-30

## 2022-12-09 RX ORDER — ATORVASTATIN CALCIUM 80 MG/1
TABLET, FILM COATED ORAL
Qty: 90 TABLET | Refills: 3 | Status: SHIPPED | OUTPATIENT
Start: 2022-12-09

## 2022-12-09 RX ORDER — BENAZEPRIL HYDROCHLORIDE 40 MG/1
TABLET, FILM COATED ORAL
Qty: 90 TABLET | Refills: 2 | Status: SHIPPED | OUTPATIENT
Start: 2022-12-09

## 2023-05-24 ENCOUNTER — OFFICE VISIT (OUTPATIENT)
Dept: CARDIOLOGY CLINIC | Age: 78
End: 2023-05-24
Payer: MEDICARE

## 2023-05-24 VITALS
BODY MASS INDEX: 22.43 KG/M2 | OXYGEN SATURATION: 97 % | HEART RATE: 79 BPM | SYSTOLIC BLOOD PRESSURE: 132 MMHG | WEIGHT: 148 LBS | DIASTOLIC BLOOD PRESSURE: 50 MMHG | HEIGHT: 68 IN

## 2023-05-24 DIAGNOSIS — E78.5 HYPERLIPIDEMIA, UNSPECIFIED HYPERLIPIDEMIA TYPE: ICD-10-CM

## 2023-05-24 DIAGNOSIS — I25.10 CORONARY ARTERY DISEASE INVOLVING NATIVE CORONARY ARTERY OF NATIVE HEART WITHOUT ANGINA PECTORIS: Primary | ICD-10-CM

## 2023-05-24 DIAGNOSIS — R60.0 LOCALIZED EDEMA: ICD-10-CM

## 2023-05-24 DIAGNOSIS — I10 ESSENTIAL HYPERTENSION: ICD-10-CM

## 2023-05-24 DIAGNOSIS — I65.23 BILATERAL CAROTID ARTERY STENOSIS: ICD-10-CM

## 2023-05-24 PROCEDURE — 1123F ACP DISCUSS/DSCN MKR DOCD: CPT | Performed by: NURSE PRACTITIONER

## 2023-05-24 PROCEDURE — 3074F SYST BP LT 130 MM HG: CPT | Performed by: NURSE PRACTITIONER

## 2023-05-24 PROCEDURE — G8427 DOCREV CUR MEDS BY ELIG CLIN: HCPCS | Performed by: NURSE PRACTITIONER

## 2023-05-24 PROCEDURE — 99214 OFFICE O/P EST MOD 30 MIN: CPT | Performed by: NURSE PRACTITIONER

## 2023-05-24 PROCEDURE — G8420 CALC BMI NORM PARAMETERS: HCPCS | Performed by: NURSE PRACTITIONER

## 2023-05-24 PROCEDURE — 1036F TOBACCO NON-USER: CPT | Performed by: NURSE PRACTITIONER

## 2023-05-24 PROCEDURE — 3078F DIAST BP <80 MM HG: CPT | Performed by: NURSE PRACTITIONER

## 2023-05-24 RX ORDER — BETHANECHOL CHLORIDE 25 MG/1
25 TABLET ORAL DAILY
COMMUNITY
Start: 2023-05-08

## 2023-05-24 NOTE — PROGRESS NOTES
[address]   Cardiac Evaluation      Patient: Ruslan Isidro  YOB: 1945  Date: 5/24/23     Chief Complaint   Patient presents with    Coronary Artery Disease     No new cardiac symptom    6 Month Follow-Up    Edema      Referring provider: CT Moseley CNP    History of Present Illness:   Mr. Mittie Bence is seen today for follow up. Cardiac history includes coronary disease, hypertension, and hyperlipidemia. He had angioplasty and stent placement of RCA and LAD in 2007 after a positive stress test and chest pain. Other history includes bladder cancer which is apparently stable. Quit smoking in 2008. I increased his Lasix to 40 mg daily and added Cardura 4 mg nightly. Lasix is currently at 20mg daily   Had been taking 50 mg of atenolol instead of 25. Heart rates have been <50 at home. He saw NPKA and HR was 53. Atenolol was decreased to 12.5mg daily. He is no longer taking Atenolol. Today, Mr Mittie Bence is here for follow up with his wife. He says he is getting slower with the walker. Neuropathy is impacting how he ambulates. Swelling is persistent in BLE but he sees improvement when he elevates his leg. He can't get compression stockings on himself. Denies chest pain, palpitations, dizziness. Unable to say how shortness of breath is. Wife adds that memory is getting worse. Past Medical History:  has a past medical history of CAD (coronary artery disease); Arthritis; Hypertension; GERD,Hyperlipidemia. Bladder cancer    Surgical History:   has a past surgical history that includes Coronary angioplasty with stent; hernia repair; Cystoscopy (6-7-10); Colonoscopy; Cystocopy (1/9/12); Cystoscopy (7/23/12); Cystocopy (2/25/13); Cystoscopy (8/26/13); Cystoscopy (4/29/14); Cystocopy (12/15/14); Cystocopy (8/3/2015); other surgical history (Left, 8/28/15); joint replacement; Cystocopy (5/24/16); and Cystocopy (06/04/2017). Social History:   reports that he quit smoking in 2008.  He

## 2023-06-08 RX ORDER — DOXAZOSIN MESYLATE 4 MG/1
TABLET ORAL
Qty: 90 TABLET | Refills: 3 | Status: SHIPPED | OUTPATIENT
Start: 2023-06-08

## 2023-06-23 ENCOUNTER — HOSPITAL ENCOUNTER (OUTPATIENT)
Dept: CARDIOLOGY | Age: 78
Discharge: HOME | End: 2023-06-23
Payer: MEDICARE

## 2023-06-23 DIAGNOSIS — I65.23 BILATERAL CAROTID ARTERY STENOSIS: ICD-10-CM

## 2023-06-23 PROCEDURE — 93880 EXTRACRANIAL BILAT STUDY: CPT

## 2023-07-07 RX ORDER — FUROSEMIDE 20 MG/1
TABLET ORAL
Qty: 90 TABLET | Refills: 2 | Status: SHIPPED | OUTPATIENT
Start: 2023-07-07

## 2023-09-05 RX ORDER — BENAZEPRIL HYDROCHLORIDE 40 MG/1
TABLET, FILM COATED ORAL
Qty: 90 TABLET | Refills: 2 | Status: SHIPPED | OUTPATIENT
Start: 2023-09-05

## 2023-12-04 DIAGNOSIS — E78.49 OTHER HYPERLIPIDEMIA: Primary | ICD-10-CM

## 2023-12-04 RX ORDER — ATORVASTATIN CALCIUM 80 MG/1
TABLET, FILM COATED ORAL
Qty: 90 TABLET | Refills: 3 | Status: SHIPPED | OUTPATIENT
Start: 2023-12-04

## 2023-12-04 NOTE — TELEPHONE ENCOUNTER
Spoke w/ pt's daughter. Rescheduled his appt with Dr Cricket Flores and notified he is due for lipids to be done.  Lipids and CMP faxed to [facility] per her request.

## 2023-12-09 LAB
A/G RATIO: 1.7 (ref 1.2–2.2)
ALBUMIN SERPL-MCNC: 4 G/DL (ref 3.8–4.8)
ALP BLD-CCNC: 84 IU/L (ref 44–121)
ALT SERPL-CCNC: 15 IU/L (ref 0–44)
AST SERPL-CCNC: 20 IU/L (ref 0–40)
BILIRUB SERPL-MCNC: 0.3 MG/DL (ref 0–1.2)
BUN / CREAT RATIO: 14 (ref 10–24)
BUN BLDV-MCNC: 18 MG/DL (ref 8–27)
CALCIUM SERPL-MCNC: 9.4 MG/DL (ref 8.6–10.2)
CHLORIDE BLD-SCNC: 108 MMOL/L (ref 96–106)
CHOLESTEROL, TOTAL: 131 MG/DL (ref 100–199)
CO2: 26 MMOL/L (ref 20–29)
COMMENT: NORMAL
CREAT SERPL-MCNC: 1.33 MG/DL (ref 0.76–1.27)
ESTIMATED GLOMERULAR FILTRATION RATE CREATININE EQUATION: 54 ML/MIN/1.73
GLOBULIN: 2.3 G/DL (ref 1.5–4.5)
GLUCOSE BLD-MCNC: 92 MG/DL (ref 70–99)
HDLC SERPL-MCNC: 51 MG/DL
LDL CHOLESTEROL CALCULATED: 66 MG/DL (ref 0–99)
POTASSIUM SERPL-SCNC: 4.2 MMOL/L (ref 3.5–5.2)
SODIUM BLD-SCNC: 147 MMOL/L (ref 134–144)
TOTAL PROTEIN: 6.3 G/DL (ref 6–8.5)
TRIGL SERPL-MCNC: 65 MG/DL (ref 0–149)
VLDLC SERPL CALC-MCNC: 14 MG/DL (ref 5–40)

## 2023-12-13 ENCOUNTER — TELEPHONE (OUTPATIENT)
Dept: CARDIOLOGY CLINIC | Age: 78
End: 2023-12-13

## 2024-04-02 RX ORDER — FUROSEMIDE 20 MG/1
TABLET ORAL
Qty: 90 TABLET | Refills: 1 | Status: SHIPPED | OUTPATIENT
Start: 2024-04-02

## 2024-04-02 NOTE — TELEPHONE ENCOUNTER
12/18/2023 OV with [facility]    6/27/2024 next OV with [facility]    12/8/2023 [facility].

## 2024-06-03 RX ORDER — DOXAZOSIN MESYLATE 4 MG/1
4 TABLET ORAL NIGHTLY
Qty: 90 TABLET | Refills: 3 | Status: SHIPPED | OUTPATIENT
Start: 2024-06-03

## 2024-06-24 NOTE — TELEPHONE ENCOUNTER
Requested Prescriptions     Pending Prescriptions Disp Refills    benazepril (LOTENSIN) 40 MG tablet [Pharmacy Med Name: BENAZEPRIL HCL 40 MG TABLET] 90 tablet 2     Sig: TAKE 1 TABLET BY MOUTH EVERY DAY IN THE MORNING      [id number]     Last OV:  12/18/2023 [facility]    Next OV: 6/27/2024 [facility]    Last Labs: 01/15/2024 [facility]      Last Filled: 09/052023 [facility]

## 2024-06-25 RX ORDER — BENAZEPRIL HYDROCHLORIDE 40 MG/1
TABLET ORAL
Qty: 90 TABLET | Refills: 2 | Status: SHIPPED | OUTPATIENT
Start: 2024-06-25

## 2024-10-01 RX ORDER — FUROSEMIDE 20 MG
TABLET ORAL
Qty: 90 TABLET | Refills: 0 | Status: SHIPPED | OUTPATIENT
Start: 2024-10-01

## 2024-10-01 NOTE — TELEPHONE ENCOUNTER
12/18/2023 OV with [facility]    6/27/2024 Missed apt    I spoke with his daughter Gina and rescheduled his apt. Labs were faxed from [facility] [phone]. He had a fall and is now getting therapy and home care.

## 2025-01-10 ENCOUNTER — TELEPHONE (OUTPATIENT)
Dept: CARDIOLOGY CLINIC | Age: 80
End: 2025-01-10

## 2025-01-10 NOTE — TELEPHONE ENCOUNTER
Daughter, Gina called and cancelled pt's appt for 1/30. She can not physically get him out of the house. Has brought hospice in to help care for him. She says he is not at end times yet and would like advise on how to proceed since he can not come in for a appt.  
I spoke to Gina and they will take care of his prescriptions. Dr. Sabillon was notified.   
4 = No assist / stand by assistance

## 2025-02-24 RX ORDER — ATORVASTATIN CALCIUM 80 MG/1
TABLET, FILM COATED ORAL
Qty: 90 TABLET | Refills: 3 | OUTPATIENT
Start: 2025-02-24

## 2025-02-24 NOTE — TELEPHONE ENCOUNTER
Received refill request for  atorvastatin (LIPITOR) 80 MG tablet  from [facility] pharmacy.     Last OV: 1/30/2025 [facility]    Next OV: None    Last Labs: 12/8/2023 Lipid    Last Filled: 12/4/2023 [facility]

## 2025-03-18 RX ORDER — FUROSEMIDE 20 MG/1
20 TABLET ORAL DAILY
Qty: 90 TABLET | Refills: 0 | OUTPATIENT
Start: 2025-03-18